# Patient Record
Sex: FEMALE | Race: BLACK OR AFRICAN AMERICAN
[De-identification: names, ages, dates, MRNs, and addresses within clinical notes are randomized per-mention and may not be internally consistent; named-entity substitution may affect disease eponyms.]

---

## 2020-04-17 ENCOUNTER — HOSPITAL ENCOUNTER (INPATIENT)
Dept: HOSPITAL 88 - ER | Age: 60
LOS: 5 days | Discharge: NURSING, REHAB OR LONG-TERM CARE | DRG: 377 | End: 2020-04-22
Attending: INTERNAL MEDICINE | Admitting: INTERNAL MEDICINE
Payer: COMMERCIAL

## 2020-04-17 VITALS — HEIGHT: 60 IN | BODY MASS INDEX: 29.66 KG/M2 | WEIGHT: 151.06 LBS

## 2020-04-17 DIAGNOSIS — I48.0: ICD-10-CM

## 2020-04-17 DIAGNOSIS — Z99.2: ICD-10-CM

## 2020-04-17 DIAGNOSIS — D62: ICD-10-CM

## 2020-04-17 DIAGNOSIS — Z86.73: ICD-10-CM

## 2020-04-17 DIAGNOSIS — E78.5: ICD-10-CM

## 2020-04-17 DIAGNOSIS — N18.6: ICD-10-CM

## 2020-04-17 DIAGNOSIS — M62.82: ICD-10-CM

## 2020-04-17 DIAGNOSIS — Z79.01: ICD-10-CM

## 2020-04-17 DIAGNOSIS — K83.1: ICD-10-CM

## 2020-04-17 DIAGNOSIS — E87.6: ICD-10-CM

## 2020-04-17 DIAGNOSIS — I13.11: ICD-10-CM

## 2020-04-17 DIAGNOSIS — G20: ICD-10-CM

## 2020-04-17 DIAGNOSIS — K55.21: Primary | ICD-10-CM

## 2020-04-17 DIAGNOSIS — I24.9: ICD-10-CM

## 2020-04-17 DIAGNOSIS — E87.5: ICD-10-CM

## 2020-04-17 DIAGNOSIS — K57.30: ICD-10-CM

## 2020-04-17 LAB
ALBUMIN SERPL-MCNC: 1.8 G/DL (ref 3.5–5)
ALBUMIN/GLOB SERPL: 0.4 {RATIO} (ref 0.8–2)
ALP SERPL-CCNC: 265 IU/L (ref 40–150)
ALT SERPL-CCNC: 471 IU/L (ref 0–55)
ANION GAP SERPL CALC-SCNC: 12.1 MMOL/L (ref 8–16)
BASOPHILS # BLD AUTO: 0.1 10*3/UL (ref 0–0.1)
BASOPHILS NFR BLD AUTO: 1 % (ref 0–1)
BUN SERPL-MCNC: 19 MG/DL (ref 7–26)
BUN/CREAT SERPL: 6 (ref 6–25)
CALCIUM SERPL-MCNC: 8.9 MG/DL (ref 8.4–10.2)
CHLORIDE SERPL-SCNC: 106 MMOL/L (ref 98–107)
CK MB SERPL-MCNC: 11.3 NG/ML (ref 0–5)
CK MB SERPL-MCNC: 9.9 NG/ML (ref 0–5)
CK SERPL-CCNC: 777 IU/L (ref 29–168)
CK SERPL-CCNC: 907 IU/L (ref 29–168)
CO2 SERPL-SCNC: 26 MMOL/L (ref 22–29)
DEPRECATED APTT PLAS QN: 59.9 SECONDS (ref 23.8–35.5)
DEPRECATED INR PLAS: 1.86
DEPRECATED NEUTROPHILS # BLD AUTO: 6 10*3/UL (ref 2.1–6.9)
EGFRCR SERPLBLD CKD-EPI 2021: 17 ML/MIN (ref 60–?)
EOSINOPHIL # BLD AUTO: 0.2 10*3/UL (ref 0–0.4)
EOSINOPHIL NFR BLD AUTO: 1.5 % (ref 0–6)
ERYTHROCYTE [DISTWIDTH] IN CORD BLOOD: 21.1 % (ref 11.7–14.4)
GLOBULIN PLAS-MCNC: 4.6 G/DL (ref 2.3–3.5)
GLUCOSE SERPLBLD-MCNC: 103 MG/DL (ref 74–118)
HCT VFR BLD AUTO: 18.9 % (ref 34.2–44.1)
HGB BLD-MCNC: 6.6 G/DL (ref 12–16)
LYMPHOCYTES # BLD: 3.5 10*3/UL (ref 1–3.2)
LYMPHOCYTES NFR BLD AUTO: 30 % (ref 18–39.1)
MCH RBC QN AUTO: 28.1 PG (ref 28–32)
MCHC RBC AUTO-ENTMCNC: 34.9 G/DL (ref 31–35)
MCV RBC AUTO: 80.4 FL (ref 81–99)
MONOCYTES # BLD AUTO: 1.9 10*3/UL (ref 0.2–0.8)
MONOCYTES NFR BLD AUTO: 16.3 % (ref 4.4–11.3)
NEUTS SEG NFR BLD AUTO: 50.9 % (ref 38.7–80)
PLATELET # BLD AUTO: 153 X10E3/UL (ref 140–360)
POTASSIUM SERPL-SCNC: 5.1 MMOL/L (ref 3.5–5.1)
PROTHROMBIN TIME: 22.8 SECONDS (ref 11.9–14.5)
RBC # BLD AUTO: 2.35 X10E6/UL (ref 3.6–5.1)
SODIUM SERPL-SCNC: 139 MMOL/L (ref 136–145)

## 2020-04-17 PROCEDURE — 80048 BASIC METABOLIC PNL TOTAL CA: CPT

## 2020-04-17 PROCEDURE — 44391 COLONOSCOPY FOR BLEEDING: CPT

## 2020-04-17 PROCEDURE — 85610 PROTHROMBIN TIME: CPT

## 2020-04-17 PROCEDURE — 83540 ASSAY OF IRON: CPT

## 2020-04-17 PROCEDURE — 84484 ASSAY OF TROPONIN QUANT: CPT

## 2020-04-17 PROCEDURE — 74181 MRI ABDOMEN W/O CONTRAST: CPT

## 2020-04-17 PROCEDURE — 82746 ASSAY OF FOLIC ACID SERUM: CPT

## 2020-04-17 PROCEDURE — 87340 HEPATITIS B SURFACE AG IA: CPT

## 2020-04-17 PROCEDURE — 93005 ELECTROCARDIOGRAM TRACING: CPT

## 2020-04-17 PROCEDURE — 99285 EMERGENCY DEPT VISIT HI MDM: CPT

## 2020-04-17 PROCEDURE — 83880 ASSAY OF NATRIURETIC PEPTIDE: CPT

## 2020-04-17 PROCEDURE — 80053 COMPREHEN METABOLIC PANEL: CPT

## 2020-04-17 PROCEDURE — 74177 CT ABD & PELVIS W/CONTRAST: CPT

## 2020-04-17 PROCEDURE — 86920 COMPATIBILITY TEST SPIN: CPT

## 2020-04-17 PROCEDURE — 86900 BLOOD TYPING SEROLOGIC ABO: CPT

## 2020-04-17 PROCEDURE — 82728 ASSAY OF FERRITIN: CPT

## 2020-04-17 PROCEDURE — 82550 ASSAY OF CK (CPK): CPT

## 2020-04-17 PROCEDURE — 85045 AUTOMATED RETICULOCYTE COUNT: CPT

## 2020-04-17 PROCEDURE — 36415 COLL VENOUS BLD VENIPUNCTURE: CPT

## 2020-04-17 PROCEDURE — 82553 CREATINE MB FRACTION: CPT

## 2020-04-17 PROCEDURE — 84466 ASSAY OF TRANSFERRIN: CPT

## 2020-04-17 PROCEDURE — 85025 COMPLETE CBC W/AUTO DIFF WBC: CPT

## 2020-04-17 PROCEDURE — 71045 X-RAY EXAM CHEST 1 VIEW: CPT

## 2020-04-17 PROCEDURE — 82607 VITAMIN B-12: CPT

## 2020-04-17 PROCEDURE — 93306 TTE W/DOPPLER COMPLETE: CPT

## 2020-04-17 PROCEDURE — 85018 HEMOGLOBIN: CPT

## 2020-04-17 PROCEDURE — 86850 RBC ANTIBODY SCREEN: CPT

## 2020-04-17 PROCEDURE — 85730 THROMBOPLASTIN TIME PARTIAL: CPT

## 2020-04-17 PROCEDURE — 85014 HEMATOCRIT: CPT

## 2020-04-17 PROCEDURE — 96361 HYDRATE IV INFUSION ADD-ON: CPT

## 2020-04-17 PROCEDURE — 30243N1 TRANSFUSION OF NONAUTOLOGOUS RED BLOOD CELLS INTO CENTRAL VEIN, PERCUTANEOUS APPROACH: ICD-10-PCS | Performed by: INTERNAL MEDICINE

## 2020-04-17 NOTE — XMS REPORT
Patient Summary Document

                             Created on: 2020



JOSHUA BORDEN

External Reference #: [id number]

: 1960

Sex: Female



Demographics







                          Address                   [address] [place] [address]  [address]

 

                          Home Phone                [phone]

 

                          Preferred Language        Unknown

 

                          Marital Status            Unknown

 

                          Church Affiliation     Unknown

 

                          Race                      Unknown

 

                          Ethnic Group              Unknown





Author







                          Author                    [facility]

 

                          [facility]

 

                          Address                   Unknown

 

                          Phone                     Unavailable







Support







                Name            Relationship    Address         Phone

 

                    JANEY RUSH    PRS                 [address] [place]  [address] [phone]

 

                    JANEY RUSH    PRS                 [address] [place]  [address] [phone]

 

                    JANEY RUSH    PRS                 UNKNOWN

[place]  [address] [phone]







Care Team Providers







                    Care Team Member Name    Role                Phone

 

                          Unavailable               Unavailable







Payers







             Payer Name    Policy Type    Policy Number    Effective Date    Expiration Date







Problems

This patient has no known problems.



Allergies, Adverse Reactions, Alerts







          Allergy Name    Allergy Type    Status    Severity    Reaction(s)    Onset Date    Inactive 

Date                      Treating Clinician        Comments

 

        No Known Allergies    DA      Active    U               2017 00:00:00                     







Medications

This patient has no known medications.



Results







           Test Description    Test Time    Test Comments    Text Results    Atomic Results    Result

 Comments

 

                AB HEPATITIS A    2020 07:09:00                      

 

   

 

                AB HEPATITIS A (test code=HAVAB)    Negative        Negative         





AB HEPATITIS A [id number] 07:09:00* 





                Test Item       Value           Reference Range    Comments

 

                AB HEPATITIS A IGM (test code=HAVMAB)                                     





AB HEPATITIS -77-25 07:09:00* 





                Test Item       Value           Reference Range    Comments

 

                AB HEPATITIS A (test code=HAVAB)    Negative        Negative         





AB HEPATITIS A [id number] 07:09:00* 





                Test Item       Value           Reference Range    Comments

 

                AB HEPATITIS A IGM (test code=HAVMAB)    Negative        Negative        Performed At:  [facility]

 [address] [id number] Jaren CHANG MD Ph:[phone]





COMPREHENSIVE METABOLIC [id number] 06:11:00* 





                Test Item       Value           Reference Range    Comments

 

                SODIUM (test code=NA)    136 mmol/L      134-147          

 

                POTASSIUM (test code=K)    3.2 mmol/L      3.4-5.0          

 

                CHLORIDE (test code=CL)    103 mmol/L      100-108          

 

                CARBON DIOXIDE (test code=CO2)    25 mmol/L       21-32            

 

                ANION GAP (test code=GAP)    8.0 GAP calc    4.0-15.0         

 

                GLUCOSE (test code=GLU)    114 MG/DL                  

 

                BLOOD UREA NITROGEN (test code=BUN)    30 MG/DL        7-18             

 

                GLOMERULAR FILTRATION RATE (test code=GFR)    24 estGFR       >60              

 

                CREATININE (test code=CREAT)    2.6 MG/DL       0.6-1.0          

 

                TOTAL PROTEIN (test code=PROT)    6.3 G/DL        6.4-8.2          

 

                ALBUMIN (test code=ALB)    1.8 G/DL        3.4-5.0          

 

                GLOBULIN (test code=GLOB)    4.5 GM/dL                        

 

                ALBUMIN/GLOBULIN RATIO (test code=A/G)    0.4 RATIO       1.2-2.2          

 

                CALCIUM (test code=CA)    8.4 MG/DL       8.5-10.1         

 

                BILIRUBIN TOTAL (test code=BILT)    2.50 MG/DL      0.2-1.2          

 

                SGOT/AST (test code=AST)    1488 Unit/L     15-37            

 

                SGPT/ALT (test code=ALT)    1697 Unit/L     12-78            

 

                ALKALINE PHOSPHATASE TOTAL (test code=ALKP)    259 Unit/L                 





CBC W/AUTO [id number] 05:52:00* 





                Test Item       Value           Reference Range    Comments

 

                WHITE BLOOD CELL (test code=WBC)    7.9 K/mm3       3.5-11.0         

 

                RED BLOOD CELL (test code=RBC)    3.13 M/mm3      4.70-6.10        

 

                HEMOGLOBIN (test code=HGB)    8.9 G/DL        10.4-14.9        

 

                HEMATOCRIT (test code=HCT)    24.5 %          31.5-44.1        

 

                MEAN CELL VOLUME (test code=MCV)    78.3 Fl         84.5-98.6        

 

                MEAN CELL HGB (test code=MCH)    28.4 pg         27.0-34.2        

 

                MEAN CELL HGB CONCETRATION (test code=MCHC)    36.3 G/DL       31.5-34.0        

 

                RED CELL DISTRIBUTION WIDTH (test code=RDW)    20.6 SD         11.5-14.5        

 

                PLATELET COUNT (test code=PLT)    241.0 K/mm3     150-450          

 

                MEAN PLATELET VOLUME (test code=MPV)    10.90 fL        7.0-10.5         

 

                NEUTROPHIL % (test code=NT%)    58.5 %          40-76            

 

                LYMPHOCYTE % (test code=LY%)    20.0 %          20.5-51.1        

 

                MONOCYTE % (test code=MO%)    17.0 %          1.7-9.3          

 

                EOSINOPHIL % (test code=EO%)    3.5 %           0.0-6.0          

 

                BASOPHIL % (test code=BA%)    1.0 %           0.0-2.0          

 

                NEUTROPHIL # (test code=NT#)    4.62 K/mm3      1.8-7.6          

 

                LYMPHOCYTE # (test code=LY#)    1.6 K/mm3       0.6-3.2          

 

                MONOCYTE # (test code=MO#)    1.3 K/mm3       0.3-1.1          

 

                EOSINOPHIL # (test code=EO#)    0.3 K/mm3       0.0-0.4          

 

                BASOPHIL # (test code=BA#)    0.1 K/mm3       0.0-0.1          

 

                MANUAL DIFF REQUIRED (test code=MDIFF)    NO DIFF/SCN     CRITERIA         





- SP FLUORO GUID CTRL ACC [id number] 17:23:00 Name: JOSHUA BORDEN            
         Formerly [facility]                      : 1960 Age/S: 59  / F      
  [id number]              Unit #: [id number]     Loc:               
[place] [address]                Phys: Danial Arboleda MD                     
                           Acct: [id number]  Dis Date:               Status: 
ADM IN                                  PHONE #: [phone]     Exam Date: 
2020  6547                     FAX #:                  Reason: TUNNELED 
DIALYSIS CATH PLACEMENT                   EXAMS:                                
              CPT:           [id number] SP FLUORO GUID CTRL ACC DEV              
 [id number]             Fluoro Time: :24        DAP (Gy m2): 45       Air Kerma 
(mGy): 2            EXAMINATION: TUNNELED CENTRAL VENOUS CATHETER PLACEMENT 
USING       ULTRASOUND AND FLUOROSCOPIC GUIDANCE.               LOCATION: S17.  
            HISTORY: JENN on CKD, hyperkalemia, request is made of tunneled      
dialysis catheter.               COMPARISON: None.               SEDATION: 
Moderate sedation was administered under the attending       physician's 
direction and continuous monitoring by a trained nurse       specialist who was 
independent from those actually performing the       procedure.  Total monitored
intraservice sedation time was 25 minutes.               RADIATION DOSE: 2 mGy. 
             TECHNIQUE: The risks, benefits and alternatives were discussed and 
     informed consent was obtained. Prior to beginning the procedure,       
Universal Protocol was used to confirm the patient's identity and       planned 
procedure. Prior to the procedure, the central veins were       evaluated by 
ultrasound, an image recorded and saved in PACS.                Maximum sterile 
barriers including cap, mask, hand hygiene, sterile       gloves, sterile gown, 
large sterile drape and cutaneous antisepsis       were used.                The
skin over the right internal jugular vein was sterilely prepped,       draped 
and infiltrated with 1% lidocaine. The vein was accessed with a       21 gauge 
needle using realtime ultrasound guidance. A guidewire and       catheter were 
then passed centrally using fluoroscopic guidance. The       intravascular valerio
th from the access site to the right atrium was then       assessed.            
   After infiltrating the skin in the subclavicular region with 1%       lidoca
ine, a short transverse incision was made and the 24 cm DuraMax       was tunnel
ed to the internal jugular access site and inserted through       a peel-away sh
eath. The catheter was flushed with 100U/ml heparin.                The incision
in the lower neck was closed using 3-0 Vicryl and       Dermabond. A sterile dr
essing was applied.                ESTIMATED BLOOD LOSS: Less than 30 milliliter
s.               DISCHARGED TO: Recovery and then to inpatient unit.            
PAGE  1                       Signed Report                    (CONTINUED)  Dave herron: JOSHUA BORDEN                      Formerly [facility]                      :  Age/S: 59  / F         [id number] [facility]              Unit #: [id number]
06     Loc:               [place] [address]                Phys: Amelia Arboleda MD                                                 Acct: [id number]  Dis Da
te:               Status: ADM IN                                  PHONE #: [phone]
[phone]     Exam Date: 2020  1412                     FAX #:              
   Reason: TUNNELED DIALYSIS CATH PLACEMENT                   EXAMS:            
                                  CPT:           [id number] SP FLUORO GUID CTRL 
ACC DEV                [id number]             Fluoro Time: :24        DAP (Gy m2): 45
      Air Kerma (mGy): 2       <Continued>        CONDITION: Stable.            
  FINDINGS: Ultrasound image shows a patent vein in the lower neck. The       
final fluoroscopic image demonstrates the catheter with its tip in the       
right atrium. No complications are seen.                  IMPRESSION: Successful
tunneled catheter placement.                   PLAN: The catheter is ready for 
immediate use. When treatment is         completed, removal can be scheduled by 
calling VIR.                 ** Electronically Signed by CAITLIN Ovalle **         **               on 2020 at 1723               **         
            Reported and signed by: Arun Ovalle M.D.                   
            CC: Danial Arboleda MD                                              
                                                                        PAGE  2 
                     Signed Report                                 Name: 
JOSHUA BORDENland                      : 
1960 Age/S: 59  / F         [id number]              Unit #: 
[id number]     Loc:               [place] [address]                Phys: 
Danial Arboleda MD                                                 Acct: 
[id number]  Dis Date:               Status: ADM IN                            
     PHONE #: [phone]     Exam Date: 2020  1415                     
FAX #:                  Reason: TUNNELED DIALYSIS CATH PLACEMENT                
  EXAMS:                                               CPT:           [id number] 
SP FLUORO GUID CTRL ACC DEV                [id number]             Fluoro Time: :24   
    DAP (Gy m2): 45       Air Kerma (mGy): 2       <Continued> Technologist: 
Oscar Reyes, RT(R)                                     Trnscb Date/Time: 
2020 () [id number].[id number]                       Orig Print D/T: S: 2020
()                                             PAGE  3                      
Signed Report                               CBC W/AUTO [id number] 05:41:00* 





                Test Item       Value           Reference Range    Comments

 

                WHITE BLOOD CELL (test code=WBC)    11.0 K/mm3      3.5-11.0         

 

                RED BLOOD CELL (test code=RBC)    3.32 M/mm3      4.70-6.10        

 

                HEMOGLOBIN (test code=HGB)    9.3 G/DL        10.4-14.9        

 

                HEMATOCRIT (test code=HCT)    26.0 %          31.5-44.1        

 

                MEAN CELL VOLUME (test code=MCV)    78.3 Fl         84.5-98.6        

 

                MEAN CELL HGB (test code=MCH)    28.0 pg         27.0-34.2        

 

                MEAN CELL HGB CONCETRATION (test code=MCHC)    35.8 G/DL       31.5-34.0        

 

                RED CELL DISTRIBUTION WIDTH (test code=RDW)    20.4 SD         11.5-14.5        

 

                PLATELET COUNT (test code=PLT)    261.0 K/mm3     150-450          

 

                MEAN PLATELET VOLUME (test code=MPV)    12.00 fL        7.0-10.5         

 

                NEUTROPHIL % (test code=NT%)    62.4 %          40-76            

 

                LYMPHOCYTE % (test code=LY%)    19.3 %          20.5-51.1        

 

                MONOCYTE % (test code=MO%)    14.7 %          1.7-9.3          

 

                EOSINOPHIL % (test code=EO%)    3.1 %           0.0-6.0          

 

                BASOPHIL % (test code=BA%)    0.5 %           0.0-2.0          

 

                NEUTROPHIL # (test code=NT#)    6.84 K/mm3      1.8-7.6          

 

                LYMPHOCYTE # (test code=LY#)    2.1 K/mm3       0.6-3.2          

 

                MONOCYTE # (test code=MO#)    1.6 K/mm3       0.3-1.1          

 

                EOSINOPHIL # (test code=EO#)    0.3 K/mm3       0.0-0.4          

 

                BASOPHIL # (test code=BA#)    0.1 K/mm3       0.0-0.1          

 

                MANUAL DIFF REQUIRED (test code=MDIFF)    NO DIFF/SCN     CRITERIA        SLIDE REVIEW CONSISTANT

 WITH AUTO DIFFERENTIAL.





CBC W/AUTO [id number] 05:41:00* 





                Test Item       Value           Reference Range    Comments

 

                WHITE BLOOD CELL (test code=WBC)    11.0 K/mm3      3.5-11.0         

 

                RED BLOOD CELL (test code=RBC)    3.32 M/mm3      4.70-6.10        

 

                HEMOGLOBIN (test code=HGB)    9.3 G/DL        10.4-14.9        

 

                HEMATOCRIT (test code=HCT)    26.0 %          31.5-44.1        

 

                MEAN CELL VOLUME (test code=MCV)    78.3 Fl         84.5-98.6        

 

                MEAN CELL HGB (test code=MCH)    28.0 pg         27.0-34.2        

 

                MEAN CELL HGB CONCETRATION (test code=MCHC)    35.8 G/DL       31.5-34.0        

 

                RED CELL DISTRIBUTION WIDTH (test code=RDW)    20.4 SD         11.5-14.5        

 

                PLATELET COUNT (test code=PLT)    261.0 K/mm3     150-450          

 

                MEAN PLATELET VOLUME (test code=MPV)    12.00 fL        7.0-10.5         

 

                NEUTROPHIL % (test code=NT%)    62.4 %          40-76            

 

                LYMPHOCYTE % (test code=LY%)    19.3 %          20.5-51.1        

 

                MONOCYTE % (test code=MO%)    14.7 %          1.7-9.3          

 

                EOSINOPHIL % (test code=EO%)    3.1 %           0.0-6.0          

 

                BASOPHIL % (test code=BA%)    0.5 %           0.0-2.0          

 

                NEUTROPHIL # (test code=NT#)    6.84 K/mm3      1.8-7.6          

 

                LYMPHOCYTE # (test code=LY#)    2.1 K/mm3       0.6-3.2          

 

                MONOCYTE # (test code=MO#)    1.6 K/mm3       0.3-1.1          

 

                EOSINOPHIL # (test code=EO#)    0.3 K/mm3       0.0-0.4          

 

                BASOPHIL # (test code=BA#)    0.1 K/mm3       0.0-0.1          

 

                MANUAL DIFF REQUIRED (test code=MDIFF)    NO DIFF/SCN     CRITERIA        SLIDE REVIEW CONSISTANT

 WITH AUTO DIFFERENTIAL.





RBC [id number] 05:41:00* 





                Test Item       Value           Reference Range    Comments

 

                ANISOCYTOSIS (test code=ANISO)    TRACE           NONE             

 

                TARGET CELLS (test code=TGT)    1+ ON SCAN      NONE             

 

                PLATELET ESTIMATE (test code=PLTEST)    ADEQUATE THOUSAND    ADEQUATE         

 

                PLATELET MORPHOLOGY (test code=PLTMORPH)    NORMAL                           





CBC W/AUTO [id number] 05:41:00* 





                Test Item       Value           Reference Range    Comments

 

                WHITE BLOOD CELL (test code=WBC)    11.0 K/mm3      3.5-11.0         

 

                RED BLOOD CELL (test code=RBC)    3.32 M/mm3      4.70-6.10        

 

                HEMOGLOBIN (test code=HGB)    9.3 G/DL        10.4-14.9        

 

                HEMATOCRIT (test code=HCT)    26.0 %          31.5-44.1        

 

                MEAN CELL VOLUME (test code=MCV)    78.3 Fl         84.5-98.6        

 

                MEAN CELL HGB (test code=MCH)    28.0 pg         27.0-34.2        

 

                MEAN CELL HGB CONCETRATION (test code=MCHC)    35.8 G/DL       31.5-34.0        

 

                RED CELL DISTRIBUTION WIDTH (test code=RDW)    20.4 SD         11.5-14.5        

 

                PLATELET COUNT (test code=PLT)    261.0 K/mm3     150-450          

 

                MEAN PLATELET VOLUME (test code=MPV)    12.00 fL        7.0-10.5         

 

                NEUTROPHIL % (test code=NT%)    62.4 %          40-76            

 

                LYMPHOCYTE % (test code=LY%)    19.3 %          20.5-51.1        

 

                MONOCYTE % (test code=MO%)    14.7 %          1.7-9.3          

 

                EOSINOPHIL % (test code=EO%)    3.1 %           0.0-6.0          

 

                BASOPHIL % (test code=BA%)    0.5 %           0.0-2.0          

 

                NEUTROPHIL # (test code=NT#)    6.84 K/mm3      1.8-7.6          

 

                LYMPHOCYTE # (test code=LY#)    2.1 K/mm3       0.6-3.2          

 

                MONOCYTE # (test code=MO#)    1.6 K/mm3       0.3-1.1          

 

                EOSINOPHIL # (test code=EO#)    0.3 K/mm3       0.0-0.4          

 

                BASOPHIL # (test code=BA#)    0.1 K/mm3       0.0-0.1          

 

                MANUAL DIFF REQUIRED (test code=MDIFF)    NO DIFF/SCN     CRITERIA        SLIDE REVIEW CONSISTANT

 WITH AUTO DIFFERENTIAL.





BASIC METABOLIC [id number] 05:33:00* 





                Test Item       Value           Reference Range    Comments

 

                SODIUM (test code=NA)    135 mmol/L      134-147          

 

                POTASSIUM (test code=K)    4.5 mmol/L      3.4-5.0          

 

                CHLORIDE (test code=CL)    104 mmol/L      100-108          

 

                CARBON DIOXIDE (test code=CO2)    22 mmol/L       21-32            

 

                ANION GAP (test code=GAP)    9.0 GAP calc    4.0-15.0         

 

                GLUCOSE (test code=GLU)    125 MG/DL                  

 

                BLOOD UREA NITROGEN (test code=BUN)    73 MG/DL        7-18             

 

                GLOMERULAR FILTRATION RATE (test code=GFR)    12 estGFR       >60              

 

                CREATININE (test code=CREAT)    4.7 MG/DL       0.6-1.0          

 

                CALCIUM (test code=CA)    8.9 MG/DL       8.5-10.1         





COMPREHENSIVE METABOLIC [id number] 05:33:00* 





                Test Item       Value           Reference Range    Comments

 

                TOTAL PROTEIN (test code=PROT)    6.5 G/DL        6.4-8.2          

 

                ALBUMIN (test code=ALB)    1.8 G/DL        3.4-5.0          

 

                GLOBULIN (test code=GLOB)    4.7 GM/dL                        

 

                ALBUMIN/GLOBULIN RATIO (test code=A/G)    0.4 RATIO       1.2-2.2          

 

                BILIRUBIN TOTAL (test code=BILT)    2.20 MG/DL      0.2-1.2          

 

                SGOT/AST (test code=AST)    1219 Unit/L     15-37            

 

                SGPT/ALT (test code=ALT)    1464 Unit/L     12-78            

 

                ALKALINE PHOSPHATASE TOTAL (test code=ALKP)    271 Unit/L                 





PROTHROMBIN [id number] 05:13:00* 





                Test Item       Value           Reference Range    Comments

 

                PT PATIENT (test code=PTP)    21.1 SECONDS    9.3-12.9         

 

                INTERNATIONAL NORMAL RATIO (test code=INR)    1.84 INR Unit    0.8-1.2          





THROMBOPLASTIN TIME [id number] 05:13:00* 





                Test Item       Value           Reference Range    Comments

 

                THROMBOPLASTIN TIME PARTIAL (test code=PTT)    31.9 SECONDS    26-35            





CBC W/AUTO [id number] 05:07:00* 





                Test Item       Value           Reference Range    Comments

 

                WHITE BLOOD CELL (test code=WBC)    11.0 K/mm3      3.5-11.0         

 

                RED BLOOD CELL (test code=RBC)    3.32 M/mm3      4.70-6.10        

 

                HEMOGLOBIN (test code=HGB)    9.3 G/DL        10.4-14.9        

 

                HEMATOCRIT (test code=HCT)    26.0 %          31.5-44.1        

 

                MEAN CELL VOLUME (test code=MCV)    78.3 Fl         84.5-98.6        

 

                MEAN CELL HGB (test code=MCH)    28.0 pg         27.0-34.2        

 

                MEAN CELL HGB CONCETRATION (test code=MCHC)    35.8 G/DL       31.5-34.0        

 

                RED CELL DISTRIBUTION WIDTH (test code=RDW)    20.4 SD         11.5-14.5        

 

                PLATELET COUNT (test code=PLT)    261.0 K/mm3     150-450          

 

                MEAN PLATELET VOLUME (test code=MPV)    12.00 fL        7.0-10.5         

 

                NEUTROPHIL % (test code=NT%)     %              40-76            

 

                LYMPHOCYTE % (test code=LY%)     %              20.5-51.1        

 

                MONOCYTE % (test code=MO%)     %              1.7-9.3          

 

                EOSINOPHIL % (test code=EO%)     %              0.0-6.0          

 

                BASOPHIL % (test code=BA%)     %              0.0-2.0          

 

                NEUTROPHIL # (test code=NT#)     K/mm3          1.8-7.6          

 

                LYMPHOCYTE # (test code=LY#)     K/mm3          0.6-3.2          

 

                MONOCYTE # (test code=MO#)     K/mm3          0.3-1.1          

 

                EOSINOPHIL # (test code=EO#)     K/mm3          0.0-0.4          

 

                BASOPHIL # (test code=BA#)     K/mm3          0.0-0.1          

 

                MANUAL DIFF REQUIRED (test code=MDIFF)     DIFF/SCN       CRITERIA         





PROTHROMBIN [id number] 10:06:00* 





                Test Item       Value           Reference Range    Comments

 

                PT PATIENT (test code=PTP)    24.4 SECONDS    9.3-12.9         

 

                INTERNATIONAL NORMAL RATIO (test code=INR)    2.13 INR Unit    0.8-1.2          





THROMBOPLASTIN TIME [id number] 10:06:00* 





                Test Item       Value           Reference Range    Comments

 

                THROMBOPLASTIN TIME PARTIAL (test code=PTT)    32.2 SECONDS    26-35            





CBC W/AUTO [id number] 06:03:00* 





                Test Item       Value           Reference Range    Comments

 

                WHITE BLOOD CELL (test code=WBC)    11.4 K/mm3      3.5-11.0         

 

                RED BLOOD CELL (test code=RBC)    3.47 M/mm3      4.70-6.10        

 

                HEMOGLOBIN (test code=HGB)    9.5 G/DL        10.4-14.9        

 

                HEMATOCRIT (test code=HCT)    27.1 %          31.5-44.1        

 

                MEAN CELL VOLUME (test code=MCV)    78.1 Fl         84.5-98.6        

 

                MEAN CELL HGB (test code=MCH)    27.4 pg         27.0-34.2        

 

                MEAN CELL HGB CONCETRATION (test code=MCHC)    35.1 G/DL       31.5-34.0        

 

                RED CELL DISTRIBUTION WIDTH (test code=RDW)    20.2 SD         11.5-14.5        

 

                PLATELET COUNT (test code=PLT)    248.0 K/mm3     150-450          

 

                MEAN PLATELET VOLUME (test code=MPV)    11.30 fL        7.0-10.5         

 

                NEUTROPHIL % (test code=NT%)    64.2 %          40-76            

 

                LYMPHOCYTE % (test code=LY%)    18.0 %          20.5-51.1        

 

                MONOCYTE % (test code=MO%)    14.2 %          1.7-9.3          

 

                EOSINOPHIL % (test code=EO%)    3.0 %           0.0-6.0          

 

                BASOPHIL % (test code=BA%)    0.6 %           0.0-2.0          

 

                NEUTROPHIL # (test code=NT#)    7.31 K/mm3      1.8-7.6          

 

                LYMPHOCYTE # (test code=LY#)    2.1 K/mm3       0.6-3.2          

 

                MONOCYTE # (test code=MO#)    1.6 K/mm3       0.3-1.1          

 

                EOSINOPHIL # (test code=EO#)    0.3 K/mm3       0.0-0.4          

 

                BASOPHIL # (test code=BA#)    0.1 K/mm3       0.0-0.1          

 

                MANUAL DIFF REQUIRED (test code=MDIFF)    NO DIFF/SCN     CRITERIA        SLIDE REVIEW CONSISTANT

 WITH AUTO DIFFERENTIAL.





CBC W/AUTO [id number] 06:03:00* 





                Test Item       Value           Reference Range    Comments

 

                WHITE BLOOD CELL (test code=WBC)    11.4 K/mm3      3.5-11.0         

 

                RED BLOOD CELL (test code=RBC)    3.47 M/mm3      4.70-6.10        

 

                HEMOGLOBIN (test code=HGB)    9.5 G/DL        10.4-14.9        

 

                HEMATOCRIT (test code=HCT)    27.1 %          31.5-44.1        

 

                MEAN CELL VOLUME (test code=MCV)    78.1 Fl         84.5-98.6        

 

                MEAN CELL HGB (test code=MCH)    27.4 pg         27.0-34.2        

 

                MEAN CELL HGB CONCETRATION (test code=MCHC)    35.1 G/DL       31.5-34.0        

 

                RED CELL DISTRIBUTION WIDTH (test code=RDW)    20.2 SD         11.5-14.5        

 

                PLATELET COUNT (test code=PLT)    248.0 K/mm3     150-450          

 

                MEAN PLATELET VOLUME (test code=MPV)    11.30 fL        7.0-10.5         

 

                NEUTROPHIL % (test code=NT%)    64.2 %          40-76            

 

                LYMPHOCYTE % (test code=LY%)    18.0 %          20.5-51.1        

 

                MONOCYTE % (test code=MO%)    14.2 %          1.7-9.3          

 

                EOSINOPHIL % (test code=EO%)    3.0 %           0.0-6.0          

 

                BASOPHIL % (test code=BA%)    0.6 %           0.0-2.0          

 

                NEUTROPHIL # (test code=NT#)    7.31 K/mm3      1.8-7.6          

 

                LYMPHOCYTE # (test code=LY#)    2.1 K/mm3       0.6-3.2          

 

                MONOCYTE # (test code=MO#)    1.6 K/mm3       0.3-1.1          

 

                EOSINOPHIL # (test code=EO#)    0.3 K/mm3       0.0-0.4          

 

                BASOPHIL # (test code=BA#)    0.1 K/mm3       0.0-0.1          

 

                MANUAL DIFF REQUIRED (test code=MDIFF)    NO DIFF/SCN     CRITERIA        SLIDE REVIEW CONSISTANT

 WITH AUTO DIFFERENTIAL.





RBC [id number] 06:03:00* 





                Test Item       Value           Reference Range    Comments

 

                ANISOCYTOSIS (test code=ANISO)    1+              NONE             

 

                MICROCYTOSIS (test code=MICR)    1+ ON SCAN      NONE             

 

                HYPERSEGMENTED POLYS (test code=HYPP)    TRACE ON SCAN    NONE             

 

                PLATELET ESTIMATE (test code=PLTEST)    ADEQUATE THOUSAND    ADEQUATE         

 

                PLATELET MORPHOLOGY (test code=PLTMORPH)    NORMAL                           





CBC W/AUTO [id number] 06:03:00* 





                Test Item       Value           Reference Range    Comments

 

                WHITE BLOOD CELL (test code=WBC)    11.4 K/mm3      3.5-11.0         

 

                RED BLOOD CELL (test code=RBC)    3.47 M/mm3      4.70-6.10        

 

                HEMOGLOBIN (test code=HGB)    9.5 G/DL        10.4-14.9        

 

                HEMATOCRIT (test code=HCT)    27.1 %          31.5-44.1        

 

                MEAN CELL VOLUME (test code=MCV)    78.1 Fl         84.5-98.6        

 

                MEAN CELL HGB (test code=MCH)    27.4 pg         27.0-34.2        

 

                MEAN CELL HGB CONCETRATION (test code=MCHC)    35.1 G/DL       31.5-34.0        

 

                RED CELL DISTRIBUTION WIDTH (test code=RDW)    20.2 SD         11.5-14.5        

 

                PLATELET COUNT (test code=PLT)    248.0 K/mm3     150-450          

 

                MEAN PLATELET VOLUME (test code=MPV)    11.30 fL        7.0-10.5         

 

                NEUTROPHIL % (test code=NT%)    64.2 %          40-76            

 

                LYMPHOCYTE % (test code=LY%)    18.0 %          20.5-51.1        

 

                MONOCYTE % (test code=MO%)    14.2 %          1.7-9.3          

 

                EOSINOPHIL % (test code=EO%)    3.0 %           0.0-6.0          

 

                BASOPHIL % (test code=BA%)    0.6 %           0.0-2.0          

 

                NEUTROPHIL # (test code=NT#)    7.31 K/mm3      1.8-7.6          

 

                LYMPHOCYTE # (test code=LY#)    2.1 K/mm3       0.6-3.2          

 

                MONOCYTE # (test code=MO#)    1.6 K/mm3       0.3-1.1          

 

                EOSINOPHIL # (test code=EO#)    0.3 K/mm3       0.0-0.4          

 

                BASOPHIL # (test code=BA#)    0.1 K/mm3       0.0-0.1          

 

                MANUAL DIFF REQUIRED (test code=MDIFF)    NO DIFF/SCN     CRITERIA        SLIDE REVIEW CONSISTANT

 WITH AUTO DIFFERENTIAL.





BASIC METABOLIC [id number] 05:10:00* 





                Test Item       Value           Reference Range    Comments

 

                SODIUM (test code=NA)    134 mmol/L      134-147          

 

                POTASSIUM (test code=K)    4.4 mmol/L      3.4-5.0          

 

                CHLORIDE (test code=CL)    103 mmol/L      100-108          

 

                CARBON DIOXIDE (test code=CO2)    22 mmol/L       21-32            

 

                ANION GAP (test code=GAP)    9.0 GAP calc    4.0-15.0         

 

                GLUCOSE (test code=GLU)    144 MG/DL                  

 

                BLOOD UREA NITROGEN (test code=BUN)    62 MG/DL        7-18             

 

                GLOMERULAR FILTRATION RATE (test code=GFR)    12 estGFR       >60              

 

                CREATININE (test code=CREAT)    4.7 MG/DL       0.6-1.0          

 

                CALCIUM (test code=CA)    8.7 MG/DL       8.5-10.1         





CBC W/AUTO [id number] 05:06:00* 





                Test Item       Value           Reference Range    Comments

 

                WHITE BLOOD CELL (test code=WBC)    11.4 K/mm3      3.5-11.0         

 

                RED BLOOD CELL (test code=RBC)    3.47 M/mm3      4.70-6.10        

 

                HEMOGLOBIN (test code=HGB)    9.5 G/DL        10.4-14.9        

 

                HEMATOCRIT (test code=HCT)    27.1 %          31.5-44.1        

 

                MEAN CELL VOLUME (test code=MCV)    78.1 Fl         84.5-98.6        

 

                MEAN CELL HGB (test code=MCH)    27.4 pg         27.0-34.2        

 

                MEAN CELL HGB CONCETRATION (test code=MCHC)    35.1 G/DL       31.5-34.0        

 

                RED CELL DISTRIBUTION WIDTH (test code=RDW)    20.2 SD         11.5-14.5        

 

                PLATELET COUNT (test code=PLT)    248.0 K/mm3     150-450          

 

                MEAN PLATELET VOLUME (test code=MPV)    11.30 fL        7.0-10.5         

 

                NEUTROPHIL % (test code=NT%)     %              40-76            

 

                LYMPHOCYTE % (test code=LY%)     %              20.5-51.1        

 

                MONOCYTE % (test code=MO%)     %              1.7-9.3          

 

                EOSINOPHIL % (test code=EO%)     %              0.0-6.0          

 

                BASOPHIL % (test code=BA%)     %              0.0-2.0          

 

                NEUTROPHIL # (test code=NT#)     K/mm3          1.8-7.6          

 

                LYMPHOCYTE # (test code=LY#)     K/mm3          0.6-3.2          

 

                MONOCYTE # (test code=MO#)     K/mm3          0.3-1.1          

 

                EOSINOPHIL # (test code=EO#)     K/mm3          0.0-0.4          

 

                BASOPHIL # (test code=BA#)     K/mm3          0.0-0.1          

 

                MANUAL DIFF REQUIRED (test code=MDIFF)     DIFF/SCN       CRITERIA         





- US ABDOMEN [id number] 09:24:00  Name: JOSHUA BORDEN                   
 Formerly [facility]                      : 1960 Age/S: 59  / F         [id number]
[address]              Unit #: [id number]     Loc:               [place]
[address]                Phys: Ney Call MD                                 
              Acct: [id number]  Dis Date:               Status: ADM IN        
                         PHONE #: [phone]     Exam Date: 2020  0830  
                  FAX #:                   Reason: ELEVATED LFTS, ABDOMINAL PAIN
                      EXAMS:                                               CPT: 
         [id number] US ABDOMEN COMPLETE                        [id number]             
      EXAMINATION:  - US ABDOMEN COMPLETE.               LOCATION: S17.         
     HISTORY: Elevated LFTs, abdominal pain, hyperkalemia, CKD.               
COMPARISON: US renal 3/25/2020.               TECHNIQUE: Examination of the 
liver, spleen, kidneys, pancreas,       gallbladder, bile ducts, aorta and 
inferior vena cava was performed.               FINDINGS:               The 
visualized liver parenchyma demonstrates diffusely increased       echogenicity.
The main portal vein is patent. Small amount of upper       abdominal free 
fluid.               There is no intra or extrahepatic biliary ductal 
dilatation. The       common duct measures 4 mm. The gallbladder is surgically 
absent.               The visualized pancreatic head and body appears 
unremarkable,       evaluation of the tail is limited by overlying bowel gas.  
Spleen is       unremarkable in size.               The right and left kidney 
measure 10.1 cm and 8.1 cm respectively.       There is no hydronephrosis. 2.1 
cm cyst in lower pole of right kidney.       1 cm cyst in lower pole of left 
kidney.               The visualized portions of abdominal aorta and IVC appear 
     unremarkable.                 IMPRESSION:                   Cholecystec
willis.                   Diffusely increased echogenicity of liver parenchyma, no
nspecific,         most commonly described in the setting of hepatic steatosis v
ersus         underlying parenchymal process.                   Small upper abdo
renata free fluid.          PAGE  1                       Signed Report          
         (CONTINUED)   Name: JOSHUA BORDEN [place]    
                 : 1960 Age/S: 59  / F         [id number] [address]      
       Unit #: [id number]     Loc:               [place] [address]             
  Phys: Ney Call MD                                                
Acct: [id number]  Dis Date:               Status: ADM IN                      
           PHONE #: [phone]     Exam Date: 2020  08                
    FAX #:                   Reason: ELEVATED LFTS, ABDOMINAL PAIN              
        EXAMS:                                               CPT:           0
[id number] US ABDOMEN COMPLETE                        [id number]               <
Continued>             ** Electronically Signed by CAITLIN Ovalle **  
      **               on 2020 at 0924               **                   
  Reported and signed by: Arun Ovalle M.D.                             
          CC: Ney Call MD; Karla HOPKINS; Danial Arboleda MD               
                                                                     
Technologist: Katarina Moore                                        [facility] 
Date/Time: 2020 (924) t.SDR.ANS4                        PAGE  2          
            Signed Report                                  Name: JOSHUA BORDEN [place]                      : 1960 Age/S: 59  
/ F         [id number] [address]              Unit #: [id number]     Loc:         
     [place] [address]                Phys: Ney Call MD               
                                Acct: [id number]  Dis Date:               
Status: ADM IN                                  PHONE #: [phone]     Exam 
Date: 2020  0830                     FAX #:                   Reason: 
ELEVATED LFTS, ABDOMINAL PAIN                       EXAMS:                      
                        CPT:           [id number] US ABDOMEN COMPLETE            
           [id number]               <Continued> Orig Print D/T: S: 2020 (927) 
   Probe:                                                                PAGE  3
                      Signed Report                               COMPREHENSIVE 
METABOLIC [id number] 06:15:00* 





                Test Item       Value           Reference Range    Comments

 

                SODIUM (test code=NA)    134 mmol/L      134-147          

 

                POTASSIUM (test code=K)    4.7 mmol/L      3.4-5.0          

 

                CHLORIDE (test code=CL)    102 mmol/L      100-108          

 

                CARBON DIOXIDE (test code=CO2)    25 mmol/L       21-32            

 

                ANION GAP (test code=GAP)    7.0 GAP calc    4.0-15.0         

 

                GLUCOSE (test code=GLU)    283 MG/DL                  

 

                BLOOD UREA NITROGEN (test code=BUN)    52 MG/DL        7-18             

 

                GLOMERULAR FILTRATION RATE (test code=GFR)    14 estGFR       >60              

 

                CREATININE (test code=CREAT)    4.2 MG/DL       0.6-1.0          

 

                TOTAL PROTEIN (test code=PROT)    5.9 G/DL        6.4-8.2          

 

                ALBUMIN (test code=ALB)    1.7 G/DL        3.4-5.0          

 

                GLOBULIN (test code=GLOB)    4.2 GM/dL                        

 

                ALBUMIN/GLOBULIN RATIO (test code=A/G)    0.4 RATIO       1.2-2.2          

 

                CALCIUM (test code=CA)    8.5 MG/DL       8.5-10.1         

 

                BILIRUBIN TOTAL (test code=BILT)    1.90 MG/DL      0.2-1.2          

 

                SGOT/AST (test code=AST)    996 Unit/L      15-37            

 

                SGPT/ALT (test code=ALT)    1142 Unit/L     12-78            

 

                ALKALINE PHOSPHATASE TOTAL (test code=ALKP)    267 Unit/L                 





[id number] 06:15:00* 





                Test Item       Value           Reference Range    Comments

 

                LIPASE (test code=LIP)    4061 Unit/L     114-286          





CBC W/AUTO [id number] 06:08:00* 





                Test Item       Value           Reference Range    Comments

 

                WHITE BLOOD CELL (test code=WBC)    12.8 K/mm3      3.5-11.0         

 

                RED BLOOD CELL (test code=RBC)    3.27 M/mm3      4.70-6.10        

 

                HEMOGLOBIN (test code=HGB)    9.1 G/DL        10.4-14.9        

 

                HEMATOCRIT (test code=HCT)    25.7 %          31.5-44.1        

 

                MEAN CELL VOLUME (test code=MCV)    78.6 Fl         84.5-98.6        

 

                MEAN CELL HGB (test code=MCH)    27.8 pg         27.0-34.2        

 

                MEAN CELL HGB CONCETRATION (test code=MCHC)    35.4 G/DL       31.5-34.0        

 

                RED CELL DISTRIBUTION WIDTH (test code=RDW)    20.0 SD         11.5-14.5        

 

                PLATELET COUNT (test code=PLT)    234.0 K/mm3     150-450          

 

                MEAN PLATELET VOLUME (test code=MPV)    11.60 fL        7.0-10.5         

 

                NEUTROPHIL % (test code=NT%)    70.7 %          40-76            

 

                LYMPHOCYTE % (test code=LY%)    15.2 %          20.5-51.1        

 

                MONOCYTE % (test code=MO%)    12.1 %          1.7-9.3          

 

                EOSINOPHIL % (test code=EO%)    1.5 %           0.0-6.0          

 

                BASOPHIL % (test code=BA%)    0.5 %           0.0-2.0          

 

                NEUTROPHIL # (test code=NT#)    9.05 K/mm3      1.8-7.6          

 

                LYMPHOCYTE # (test code=LY#)    2.0 K/mm3       0.6-3.2          

 

                MONOCYTE # (test code=MO#)    1.6 K/mm3       0.3-1.1          

 

                EOSINOPHIL # (test code=EO#)    0.2 K/mm3       0.0-0.4          

 

                BASOPHIL # (test code=BA#)    0.1 K/mm3       0.0-0.1          

 

                MANUAL DIFF REQUIRED (test code=MDIFF)    NO DIFF/SCN     CRITERIA        SLIDE REVIEW CONSISTANT

 WITH AUTO DIFFERENTIAL.





GLUCOSE BEDSIDE [id number] 05:38:00* 





                Test Item       Value           Reference Range    Comments

 

                GLUCOSE BEDSIDE TESTING (test code=GLUBED)    254 mg/dL                  





GLUCOSE BEDSIDE [id number] 05:38:00* 





                Test Item       Value           Reference Range    Comments

 

                GLUCOSE BEDSIDE TESTING (test code=GLUBED)    124 mg/dL                  





CBC W/AUTO [id number] 04:16:00* 





                Test Item       Value           Reference Range    Comments

 

                WHITE BLOOD CELL (test code=WBC)    12.8 K/mm3      3.5-11.0         

 

                RED BLOOD CELL (test code=RBC)    3.27 M/mm3      4.70-6.10        

 

                HEMOGLOBIN (test code=HGB)    9.1 G/DL        10.4-14.9        

 

                HEMATOCRIT (test code=HCT)    25.7 %          31.5-44.1        

 

                MEAN CELL VOLUME (test code=MCV)    78.6 Fl         84.5-98.6        

 

                MEAN CELL HGB (test code=MCH)    27.8 pg         27.0-34.2        

 

                MEAN CELL HGB CONCETRATION (test code=MCHC)    35.4 G/DL       31.5-34.0        

 

                RED CELL DISTRIBUTION WIDTH (test code=RDW)    20.0 SD         11.5-14.5        

 

                PLATELET COUNT (test code=PLT)    234.0 K/mm3     150-450          

 

                MEAN PLATELET VOLUME (test code=MPV)    11.60 fL        7.0-10.5         

 

                NEUTROPHIL % (test code=NT%)     %              40-76            

 

                LYMPHOCYTE % (test code=LY%)     %              20.5-51.1        

 

                MONOCYTE % (test code=MO%)     %              1.7-9.3          

 

                EOSINOPHIL % (test code=EO%)     %              0.0-6.0          

 

                BASOPHIL % (test code=BA%)     %              0.0-2.0          

 

                NEUTROPHIL # (test code=NT#)     K/mm3          1.8-7.6          

 

                LYMPHOCYTE # (test code=LY#)     K/mm3          0.6-3.2          

 

                MONOCYTE # (test code=MO#)     K/mm3          0.3-1.1          

 

                EOSINOPHIL # (test code=EO#)     K/mm3          0.0-0.4          

 

                BASOPHIL # (test code=BA#)     K/mm3          0.0-0.1          

 

                MANUAL DIFF REQUIRED (test code=MDIFF)     DIFF/SCN       CRITERIA         





NEUTROPHIL CYTOPLASMIC [id number] 15:09:00* 





                Test Item       Value           Reference Range    Comments

 

                AB ANTI-NEUTROPHIL CYTO C (test code=NEUTCABC)    <1:20 titer     Neg:<1:20        

 

                AB ANTI-NEUTROPHIL CYTO P (test code=NEUTCAB-P)    <1:20 titer     Neg:<1:20       The presence

 of positive fluorescence exhibiting P-ANCA orC-ANCA patterns alone is not 
specific for the diagnosis ofWegener's Granulomatosis (WG) or microscopic 
polyangiitis.Decisions about treatment should not be based solely onANCA IFA 
results.  The [employer] ANCA Group Consensusrecommends follow up testing of 
positive sera with both WA-3 and MPO-ANCA enzyme immunoassays. As many as 5% 
serumsamples are positive only by EIA. Ref. AM J Clin [id number];111:507-513.

 

                ATYPICAL ANCA (test code=ANCACOM)    <1:20 titer     Neg:<1:20       The atypical pANCA pattern

 has been observed in asignificant percentage of patients with ulcerative 
colitis,primary sclerosing cholangitis and autoimmune hepatitis.Performed At: 
[facility] [id number] Sanjai MD 
Ph:[phone]





COMPLEMENT [id number] 15:09:00* 





                Test Item       Value           Reference Range    Comments

 

                COMPLEMENT C3 (test code=COMC3)    87 mg/dL                  Performed At:  [facility] [id number] CHANG MD Ph:[phone]





COMPLEMENT [id number] 15:09:00* 





                Test Item       Value           Reference Range    Comments

 

                COMPLEMENT C4 (test code=COMC4)    4 mg/dL         14-44            





NEUTROPHIL CYTOPLASMIC [id number] 15:09:00* 





                Test Item       Value           Reference Range    Comments

 

                AB ANTI-NEUTROPHIL CYTO C (test code=NEUTCABC)    <1:20 titer     Neg:<1:20        

 

                AB ANTI-NEUTROPHIL CYTO P (test code=NEUTCAB-P)    <1:20 titer     Neg:<1:20       The presence

 of positive fluorescence exhibiting P-ANCA orC-ANCA patterns alone is not 
specific for the diagnosis ofWegener's Granulomatosis (WG) or microscopic 
polyangiitis.Decisions about treatment should not be based solely onANCA IFA 
results.  The [employer] ANCA Group Consensusrecommends follow up testing of 
positive sera with both WA-3 and MPO-ANCA enzyme immunoassays. As many as 5% 
serumsamples are positive only by EIA. Ref. AM J Clin [id number];111:507-513.

 

                ATYPICAL ANCA (test code=ANCACOM)    <1:20 titer     Neg:<1:20       The atypical pANCA pattern

 has been observed in asignificant percentage of patients with ulcerative 
colitis,primary sclerosing cholangitis and autoimmune hepatitis.Performed At: 
[facility] [id number] Laughlin MD 
Ph:[phone]





COMPLEMENT [id number] 15:09:00* 





                Test Item       Value           Reference Range    Comments

 

                COMPLEMENT C3 (test code=COMC3)    87 mg/dL                  Performed At:  [facility] [id number] CHANG MD Ph:[phone]





COMPLEMENT [id number] 15:09:00* 





                Test Item       Value           Reference Range    Comments

 

                COMPLEMENT C4 (test code=COMC4)    4 mg/dL         14-44            





ANTINUCLEAR ANTIBODIES [id number] 14:08:00* 





                Test Item       Value           Reference Range    Comments

 

                MARGARET TITER (test code=ANATITR)    Negative        ()                                                 

  Negative   <1:80                                     Borderline  1:80         
                           Positive   >1:80Performed At:  [facility] [id number] CHANG MD Ph:[phone]





AG HEPATITIS B [id number] 14:08:00* 





                Test Item       Value           Reference Range    Comments

 

                AG HEPATITIS B SURFACE (test code=HBSAG)    Negative        Negative        Performed At:  [facility] [id number] CHANG MD Ph:[phone]





AB HEPATITIS  14:08:00* 





                Test Item       Value           Reference Range    Comments

 

                AB HEPATITIS C (test code=HCVAB)    0.1             0.0-0.9         INFCE Result Units: s/co ratio  

                                Negative:     < 0.8                             
Indeterminate: 0.8 - 0.9                                  Positive:     > 0.9 
The CDC recommends that a positive HCV antibody result be followed up with a HCV
Nucleic Acid Amplification test (192172).





NEUTROPHIL CYTOPLASMIC [id number] 14:08:00* 





                Test Item       Value           Reference Range    Comments

 

                AB ANTI-NEUTROPHIL CYTO C (test code=NEUTCABC)    <1:20 titer     Neg:<1:20        

 

                AB ANTI-NEUTROPHIL CYTO P (test code=NEUTCAB-P)    <1:20 titer     Neg:<1:20       The presence

 of positive fluorescence exhibiting P-ANCA orC-ANCA patterns alone is not 
specific for the diagnosis ofWegener's Granulomatosis (WG) or microscopic 
polyangiitis.Decisions about treatment should not be based solely onANCA IFA 
results.  The International ANCA Group Consensusrecommends follow up testing of 
positive sera with both WA-3 and MPO-ANCA enzyme immunoassays. As many as 5% 
serumsamples are positive only by EIA. Ref. AM J Clin [id number];111:507-513.

 

                ATYPICAL ANCA (test code=ANCACOM)    <1:20 titer     Neg:<1:20       The atypical pANCA pattern

 has been observed in asignificant percentage of patients with ulcerative 
colitis,primary sclerosing cholangitis and autoimmune hepatitis.Performed At: 
[facility] [id number] Sanjai MD 
Ph:[phone]





AB DNA DOUBLE [id number] 14:08:00* 





                Test Item       Value           Reference Range    Comments

 

                AB DNA DOUBLE STRAND (test code=DNADSAB)    1 IU/mL         0-9                                     

           Negative      <5                                   Equivocal  5 - 9  
                                Positive      >9Performed At:  [facility] 
[address] [id number] CHANG MD Ph:[phone]





COMPLEMENT [id number] 14:08:00* 





                Test Item       Value           Reference Range    Comments

 

                COMPLEMENT C3 (test code=COMC3)    67 mg/dL                  Performed At:  [facility] [address] [id number] CHANG MD Ph:[phone]





COMPLEMENT [id number] 14:08:00* 





                Test Item       Value           Reference Range    Comments

 

                COMPLEMENT C4 (test code=COMC4)    3 mg/dL         14-44            





NEUTROPHIL CYTOPLASMIC [id number] 08:09:00* 





                Test Item       Value           Reference Range    Comments

 

                AB ANTI-NEUTROPHIL CYTO C (test code=NEUTCABC)                                     

 

                AB ANTI-NEUTROPHIL CYTO P (test code=NEUTCAB-P)                                     

 

                ATYPICAL ANCA (test code=ANCACOM)                                     





COMPLEMENT [id number] 08:09:00* 





                Test Item       Value           Reference Range    Comments

 

                COMPLEMENT C3 (test code=COMC3)                                     





COMPLEMENT [id number] 08:09:00* 





                Test Item       Value           Reference Range    Comments

 

                COMPLEMENT C4 (test code=COMC4)    4 mg/dL         14-44            





NEUTROPHIL CYTOPLASMIC [id number] 08:09:00* 





                Test Item       Value           Reference Range    Comments

 

                AB ANTI-NEUTROPHIL CYTO C (test code=NEUTCABC)                                     

 

                AB ANTI-NEUTROPHIL CYTO P (test code=NEUTCAB-P)                                     

 

                ATYPICAL ANCA (test code=ANCACOM)                                     





COMPLEMENT [id number] 08:09:00* 





                Test Item       Value           Reference Range    Comments

 

                COMPLEMENT C3 (test code=COMC3)    87 mg/dL                  Performed At:  [facility] [id number] Kyle L MD Ph:[phone]





COMPLEMENT [id number] 08:09:00* 





                Test Item       Value           Reference Range    Comments

 

                COMPLEMENT C4 (test code=COMC4)    4 mg/dL         14-44            





GLUCOSE BEDSIDE [id number] 07:26:00* 





                Test Item       Value           Reference Range    Comments

 

                GLUCOSE BEDSIDE TESTING (test code=GLUBED)    135 mg/dL                  





CBC W/AUTO [id number] 04:42:00* 





                Test Item       Value           Reference Range    Comments

 

                WHITE BLOOD CELL (test code=WBC)    13.7 K/mm3      3.5-11.0         

 

                RED BLOOD CELL (test code=RBC)    3.35 M/mm3      4.70-6.10        

 

                HEMOGLOBIN (test code=HGB)    9.1 G/DL        10.4-14.9        

 

                HEMATOCRIT (test code=HCT)    26.0 %          31.5-44.1        

 

                MEAN CELL VOLUME (test code=MCV)    77.6 Fl         84.5-98.6        

 

                MEAN CELL HGB (test code=MCH)    27.2 pg         27.0-34.2        

 

                MEAN CELL HGB CONCETRATION (test code=MCHC)    35.0 G/DL       31.5-34.0        

 

                RED CELL DISTRIBUTION WIDTH (test code=RDW)    19.5 SD         11.5-14.5        

 

                PLATELET COUNT (test code=PLT)    202.0 K/mm3     150-450          

 

                MEAN PLATELET VOLUME (test code=MPV)    10.40 fL        7.0-10.5         

 

                MANUAL DIFF REQUIRED (test code=MDIFF)    YES DIFF/SCN    CRITERIA         





WBC [id number] 04:42:00* 





                Test Item       Value           Reference Range    Comments

 

                SEGMENTED NEUTROPHILS (test code=SEG)     %              40-75            

 

                LYMPHOCYTE (test code=LYMPH)     %              18.7-40.6        





CBC W/AUTO [id number] 04:42:00* 





                Test Item       Value           Reference Range    Comments

 

                WHITE BLOOD CELL (test code=WBC)    13.7 K/mm3      3.5-11.0         

 

                RED BLOOD CELL (test code=RBC)    3.35 M/mm3      4.70-6.10        

 

                HEMOGLOBIN (test code=HGB)    9.1 G/DL        10.4-14.9        

 

                HEMATOCRIT (test code=HCT)    26.0 %          31.5-44.1        

 

                MEAN CELL VOLUME (test code=MCV)    77.6 Fl         84.5-98.6        

 

                MEAN CELL HGB (test code=MCH)    27.2 pg         27.0-34.2        

 

                MEAN CELL HGB CONCETRATION (test code=MCHC)    35.0 G/DL       31.5-34.0        

 

                RED CELL DISTRIBUTION WIDTH (test code=RDW)    19.5 SD         11.5-14.5        

 

                PLATELET COUNT (test code=PLT)    202.0 K/mm3     150-450          

 

                MEAN PLATELET VOLUME (test code=MPV)    10.40 fL        7.0-10.5         

 

                MANUAL DIFF REQUIRED (test code=MDIFF)    YES DIFF/SCN    CRITERIA         





WBC [id number] 04:42:00* 





                Test Item       Value           Reference Range    Comments

 

                SEGMENTED NEUTROPHILS (test code=SEG)    78 %            40-75            

 

                LYMPHOCYTE (test code=LYMPH)    13 %            18.7-40.6        

 

                MONOCYTE (test code=MON)    8 %             3.8-11.4         

 

                EOSINOPHIL (test code=EOS)    1 %             0.0-4.1          

 

                ANISOCYTOSIS (test code=ANISO)    1+              NONE             

 

                MICROCYTOSIS (test code=MICR)    1+ ON SCAN      NONE             

 

                TARGET CELLS (test code=TGT)    3+ ON SCAN      NONE             

 

                SCHISTOCYTES (test code=ANDREW)    1+ ON SCAN      NONE             

 

                PLATELET ESTIMATE (test code=PLTEST)    ADEQUATE THOUSAND    ADEQUATE         

 

                PLATELET MORPHOLOGY (test code=PLTMORPH)    NORMAL                           





CBC W/AUTO [id number] 04:42:00* 





                Test Item       Value           Reference Range    Comments

 

                WHITE BLOOD CELL (test code=WBC)    13.7 K/mm3      3.5-11.0         

 

                RED BLOOD CELL (test code=RBC)    3.35 M/mm3      4.70-6.10        

 

                HEMOGLOBIN (test code=HGB)    9.1 G/DL        10.4-14.9        

 

                HEMATOCRIT (test code=HCT)    26.0 %          31.5-44.1        

 

                MEAN CELL VOLUME (test code=MCV)    77.6 Fl         84.5-98.6        

 

                MEAN CELL HGB (test code=MCH)    27.2 pg         27.0-34.2        

 

                MEAN CELL HGB CONCETRATION (test code=MCHC)    35.0 G/DL       31.5-34.0        

 

                RED CELL DISTRIBUTION WIDTH (test code=RDW)    19.5 SD         11.5-14.5        

 

                PLATELET COUNT (test code=PLT)    202.0 K/mm3     150-450          

 

                MEAN PLATELET VOLUME (test code=MPV)    10.40 fL        7.0-10.5         

 

                MANUAL DIFF REQUIRED (test code=MDIFF)    YES DIFF/SCN    CRITERIA         





WBC [id number] 04:42:00* 





                Test Item       Value           Reference Range    Comments

 

                SEGMENTED NEUTROPHILS (test code=SEG)     %              40-75            

 

                LYMPHOCYTE (test code=LYMPH)     %              18.7-40.6        





COMPREHENSIVE METABOLIC [id number] 04:34:00* 





                Test Item       Value           Reference Range    Comments

 

                SODIUM (test code=NA)    137 mmol/L      134-147          

 

                POTASSIUM (test code=K)    4.5 mmol/L      3.4-5.0          

 

                CHLORIDE (test code=CL)    104 mmol/L      100-108          

 

                CARBON DIOXIDE (test code=CO2)    25 mmol/L       21-32            

 

                ANION GAP (test code=GAP)    8.0 GAP calc    4.0-15.0         

 

                GLUCOSE (test code=GLU)    155 MG/DL                  

 

                BLOOD UREA NITROGEN (test code=BUN)    35 MG/DL        7-18             

 

                GLOMERULAR FILTRATION RATE (test code=GFR)    19 estGFR       >60              

 

                CREATININE (test code=CREAT)    3.2 MG/DL       0.6-1.0          

 

                TOTAL PROTEIN (test code=PROT)    6.5 G/DL        6.4-8.2          

 

                ALBUMIN (test code=ALB)    1.9 G/DL        3.4-5.0          

 

                GLOBULIN (test code=GLOB)    4.6 GM/dL                        

 

                ALBUMIN/GLOBULIN RATIO (test code=A/G)    0.4 RATIO       1.2-2.2          

 

                CALCIUM (test code=CA)    8.5 MG/DL       8.5-10.1         

 

                BILIRUBIN TOTAL (test code=BILT)    2.10 MG/DL      0.2-1.2          

 

                SGOT/AST (test code=AST)    992 Unit/L      15-37            

 

                SGPT/ALT (test code=ALT)    1114 Unit/L     12-78            

 

                ALKALINE PHOSPHATASE TOTAL (test code=ALKP)    304 Unit/L                 





CBC W/AUTO [id number] 04:22:00* 





                Test Item       Value           Reference Range    Comments

 

                WHITE BLOOD CELL (test code=WBC)    13.7 K/mm3      3.5-11.0         

 

                RED BLOOD CELL (test code=RBC)    3.35 M/mm3      4.70-6.10        

 

                HEMOGLOBIN (test code=HGB)    9.1 G/DL        10.4-14.9        

 

                HEMATOCRIT (test code=HCT)    26.0 %          31.5-44.1        

 

                MEAN CELL VOLUME (test code=MCV)    77.6 Fl         84.5-98.6        

 

                MEAN CELL HGB (test code=MCH)    27.2 pg         27.0-34.2        

 

                MEAN CELL HGB CONCETRATION (test code=MCHC)    35.0 G/DL       31.5-34.0        

 

                RED CELL DISTRIBUTION WIDTH (test code=RDW)    19.5 SD         11.5-14.5        

 

                PLATELET COUNT (test code=PLT)    202.0 K/mm3     150-450          

 

                MEAN PLATELET VOLUME (test code=MPV)    10.40 fL        7.0-10.5         

 

                NEUTROPHIL % (test code=NT%)     %              40-76            

 

                LYMPHOCYTE % (test code=LY%)     %              20.5-51.1        

 

                MONOCYTE % (test code=MO%)     %              1.7-9.3          

 

                EOSINOPHIL % (test code=EO%)     %              0.0-6.0          

 

                BASOPHIL % (test code=BA%)     %              0.0-2.0          

 

                NEUTROPHIL # (test code=NT#)     K/mm3          1.8-7.6          

 

                LYMPHOCYTE # (test code=LY#)     K/mm3          0.6-3.2          

 

                MONOCYTE # (test code=MO#)     K/mm3          0.3-1.1          

 

                EOSINOPHIL # (test code=EO#)     K/mm3          0.0-0.4          

 

                BASOPHIL # (test code=BA#)     K/mm3          0.0-0.1          

 

                MANUAL DIFF REQUIRED (test code=MDIFF)     DIFF/SCN       CRITERIA         





CBC W/AUTO [id number] 06:35:00* 





                Test Item       Value           Reference Range    Comments

 

                WHITE BLOOD CELL (test code=WBC)    17.8 K/mm3      3.5-11.0         

 

                RED BLOOD CELL (test code=RBC)    3.46 M/mm3      4.70-6.10        

 

                HEMOGLOBIN (test code=HGB)    9.4 G/DL        10.4-14.9        

 

                HEMATOCRIT (test code=HCT)    26.8 %          31.5-44.1        

 

                MEAN CELL VOLUME (test code=MCV)    77.5 Fl         84.5-98.6        

 

                MEAN CELL HGB (test code=MCH)    27.2 pg         27.0-34.2        

 

                MEAN CELL HGB CONCETRATION (test code=MCHC)    35.1 G/DL       31.5-34.0        

 

                RED CELL DISTRIBUTION WIDTH (test code=RDW)    19.0 SD         11.5-14.5        

 

                PLATELET COUNT (test code=PLT)    190.0 K/mm3     150-450          

 

                NEUTROPHIL % (test code=NT%)    72.4 %          40-76            

 

                LYMPHOCYTE % (test code=LY%)    14.1 %          20.5-51.1        

 

                MONOCYTE % (test code=MO%)    11.1 %          1.7-9.3          

 

                EOSINOPHIL % (test code=EO%)    2.2 %           0.0-6.0          

 

                BASOPHIL % (test code=BA%)    0.2 %           0.0-2.0          

 

                NEUTROPHIL # (test code=NT#)    12.90 K/mm3     1.8-7.6          

 

                LYMPHOCYTE # (test code=LY#)    2.5 K/mm3       0.6-3.2          

 

                MONOCYTE # (test code=MO#)    2.0 K/mm3       0.3-1.1          

 

                EOSINOPHIL # (test code=EO#)    0.4 K/mm3       0.0-0.4          

 

                BASOPHIL # (test code=BA#)    0.0 K/mm3       0.0-0.1          

 

                MANUAL DIFF REQUIRED (test code=MDIFF)    NO DIFF/SCN     CRITERIA        SLIDE REVIEW CONSISTANT

 WITH AUTO DIFFERENTIAL.





CBC W/AUTO [id number] 06:35:00* 





                Test Item       Value           Reference Range    Comments

 

                WHITE BLOOD CELL (test code=WBC)    17.8 K/mm3      3.5-11.0         

 

                RED BLOOD CELL (test code=RBC)    3.46 M/mm3      4.70-6.10        

 

                HEMOGLOBIN (test code=HGB)    9.4 G/DL        10.4-14.9        

 

                HEMATOCRIT (test code=HCT)    26.8 %          31.5-44.1        

 

                MEAN CELL VOLUME (test code=MCV)    77.5 Fl         84.5-98.6        

 

                MEAN CELL HGB (test code=MCH)    27.2 pg         27.0-34.2        

 

                MEAN CELL HGB CONCETRATION (test code=MCHC)    35.1 G/DL       31.5-34.0        

 

                RED CELL DISTRIBUTION WIDTH (test code=RDW)    19.0 SD         11.5-14.5        

 

                PLATELET COUNT (test code=PLT)    190.0 K/mm3     150-450          

 

                NEUTROPHIL % (test code=NT%)    72.4 %          40-76            

 

                LYMPHOCYTE % (test code=LY%)    14.1 %          20.5-51.1        

 

                MONOCYTE % (test code=MO%)    11.1 %          1.7-9.3          

 

                EOSINOPHIL % (test code=EO%)    2.2 %           0.0-6.0          

 

                BASOPHIL % (test code=BA%)    0.2 %           0.0-2.0          

 

                NEUTROPHIL # (test code=NT#)    12.90 K/mm3     1.8-7.6          

 

                LYMPHOCYTE # (test code=LY#)    2.5 K/mm3       0.6-3.2          

 

                MONOCYTE # (test code=MO#)    2.0 K/mm3       0.3-1.1          

 

                EOSINOPHIL # (test code=EO#)    0.4 K/mm3       0.0-0.4          

 

                BASOPHIL # (test code=BA#)    0.0 K/mm3       0.0-0.1          

 

                MANUAL DIFF REQUIRED (test code=MDIFF)    NO DIFF/SCN     CRITERIA        SLIDE REVIEW CONSISTANT

 WITH AUTO DIFFERENTIAL.





RBC [id number] 06:35:00* 





                Test Item       Value           Reference Range    Comments

 

                PLATELET ESTIMATE (test code=PLTEST)    ADEQUATE THOUSAND    ADEQUATE         

 

                PLATELET MORPHOLOGY (test code=PLTMORPH)    NORMAL                           





CBC W/AUTO [id number] 06:35:00* 





                Test Item       Value           Reference Range    Comments

 

                WHITE BLOOD CELL (test code=WBC)    17.8 K/mm3      3.5-11.0         

 

                RED BLOOD CELL (test code=RBC)    3.46 M/mm3      4.70-6.10        

 

                HEMOGLOBIN (test code=HGB)    9.4 G/DL        10.4-14.9        

 

                HEMATOCRIT (test code=HCT)    26.8 %          31.5-44.1        

 

                MEAN CELL VOLUME (test code=MCV)    77.5 Fl         84.5-98.6        

 

                MEAN CELL HGB (test code=MCH)    27.2 pg         27.0-34.2        

 

                MEAN CELL HGB CONCETRATION (test code=MCHC)    35.1 G/DL       31.5-34.0        

 

                RED CELL DISTRIBUTION WIDTH (test code=RDW)    19.0 SD         11.5-14.5        

 

                PLATELET COUNT (test code=PLT)    190.0 K/mm3     150-450          

 

                NEUTROPHIL % (test code=NT%)    72.4 %          40-76            

 

                LYMPHOCYTE % (test code=LY%)    14.1 %          20.5-51.1        

 

                MONOCYTE % (test code=MO%)    11.1 %          1.7-9.3          

 

                EOSINOPHIL % (test code=EO%)    2.2 %           0.0-6.0          

 

                BASOPHIL % (test code=BA%)    0.2 %           0.0-2.0          

 

                NEUTROPHIL # (test code=NT#)    12.90 K/mm3     1.8-7.6          

 

                LYMPHOCYTE # (test code=LY#)    2.5 K/mm3       0.6-3.2          

 

                MONOCYTE # (test code=MO#)    2.0 K/mm3       0.3-1.1          

 

                EOSINOPHIL # (test code=EO#)    0.4 K/mm3       0.0-0.4          

 

                BASOPHIL # (test code=BA#)    0.0 K/mm3       0.0-0.1          

 

                MANUAL DIFF REQUIRED (test code=MDIFF)    NO DIFF/SCN     CRITERIA        SLIDE REVIEW CONSISTANT

 WITH AUTO DIFFERENTIAL.





CBC W/AUTO [id number] 06:04:00* 





                Test Item       Value           Reference Range    Comments

 

                WHITE BLOOD CELL (test code=WBC)    17.8 K/mm3      3.5-11.0         

 

                RED BLOOD CELL (test code=RBC)    3.46 M/mm3      4.70-6.10        

 

                HEMOGLOBIN (test code=HGB)    9.4 G/DL        10.4-14.9        

 

                HEMATOCRIT (test code=HCT)    26.8 %          31.5-44.1        

 

                MEAN CELL VOLUME (test code=MCV)    77.5 Fl         84.5-98.6        

 

                MEAN CELL HGB (test code=MCH)    27.2 pg         27.0-34.2        

 

                MEAN CELL HGB CONCETRATION (test code=MCHC)    35.1 G/DL       31.5-34.0        

 

                RED CELL DISTRIBUTION WIDTH (test code=RDW)    19.0 SD         11.5-14.5        

 

                PLATELET COUNT (test code=PLT)    190.0 K/mm3     150-450          

 

                NEUTROPHIL % (test code=NT%)     %              40-76            

 

                LYMPHOCYTE % (test code=LY%)     %              20.5-51.1        

 

                MONOCYTE % (test code=MO%)     %              1.7-9.3          

 

                EOSINOPHIL % (test code=EO%)     %              0.0-6.0          

 

                BASOPHIL % (test code=BA%)     %              0.0-2.0          

 

                NEUTROPHIL # (test code=NT#)     K/mm3          1.8-7.6          

 

                LYMPHOCYTE # (test code=LY#)     K/mm3          0.6-3.2          

 

                MONOCYTE # (test code=MO#)     K/mm3          0.3-1.1          

 

                EOSINOPHIL # (test code=EO#)     K/mm3          0.0-0.4          

 

                BASOPHIL # (test code=BA#)     K/mm3          0.0-0.1          

 

                MANUAL DIFF REQUIRED (test code=MDIFF)     DIFF/SCN       CRITERIA         





BASIC METABOLIC [id number] 05:57:00* 





                Test Item       Value           Reference Range    Comments

 

                SODIUM (test code=NA)    136 mmol/L      134-147          

 

                POTASSIUM (test code=K)    3.9 mmol/L      3.4-5.0          

 

                CHLORIDE (test code=CL)    103 mmol/L      100-108          

 

                CARBON DIOXIDE (test code=CO2)    23 mmol/L       21-32            

 

                ANION GAP (test code=GAP)    10.0 GAP calc    4.0-15.0         

 

                GLUCOSE (test code=GLU)    195 MG/DL                  

 

                BLOOD UREA NITROGEN (test code=BUN)    43 MG/DL        7-18             

 

                GLOMERULAR FILTRATION RATE (test code=GFR)    15 estGFR       >60              

 

                CREATININE (test code=CREAT)    4.0 MG/DL       0.6-1.0          

 

                CALCIUM (test code=CA)    8.1 MG/DL       8.5-10.1         





CBC W/AUTO [id number] 06:15:00* 





                Test Item       Value           Reference Range    Comments

 

                WHITE BLOOD CELL (test code=WBC)    16.5 K/mm3      3.5-11.0         

 

                RED BLOOD CELL (test code=RBC)    3.75 M/mm3      4.70-6.10        

 

                HEMOGLOBIN (test code=HGB)    10.2 G/DL       10.4-14.9        

 

                HEMATOCRIT (test code=HCT)    28.5 %          31.5-44.1        

 

                MEAN CELL VOLUME (test code=MCV)    76.0 Fl         84.5-98.6        

 

                MEAN CELL HGB (test code=MCH)    27.2 pg         27.0-34.2        

 

                MEAN CELL HGB CONCETRATION (test code=MCHC)    35.8 G/DL       31.5-34.0        

 

                RED CELL DISTRIBUTION WIDTH (test code=RDW)    18.7 SD         11.5-14.5        

 

                PLATELET COUNT (test code=PLT)    176.0 K/mm3     150-450          

 

                NEUTROPHIL % (test code=NT%)    73.2 %          40-76            

 

                LYMPHOCYTE % (test code=LY%)    13.7 %          20.5-51.1        

 

                MONOCYTE % (test code=MO%)    10.1 %          1.7-9.3          

 

                EOSINOPHIL % (test code=EO%)    2.8 %           0.0-6.0          

 

                BASOPHIL % (test code=BA%)    0.2 %           0.0-2.0          

 

                NEUTROPHIL # (test code=NT#)    12.05 K/mm3     1.8-7.6          

 

                LYMPHOCYTE # (test code=LY#)    2.3 K/mm3       0.6-3.2          

 

                MONOCYTE # (test code=MO#)    1.7 K/mm3       0.3-1.1          

 

                EOSINOPHIL # (test code=EO#)    0.5 K/mm3       0.0-0.4          

 

                BASOPHIL # (test code=BA#)    0.0 K/mm3       0.0-0.1          

 

                MANUAL DIFF REQUIRED (test code=MDIFF)    NO DIFF/SCN     CRITERIA        SLIDE REVIEW CONSISTANT

 WITH AUTO DIFFERENTIAL.





RBC [id number] 06:15:00* 





                Test Item       Value           Reference Range    Comments

 

                PLATELET ESTIMATE (test code=PLTEST)    ADEQUATE THOUSAND    ADEQUATE         

 

                PLATELET MORPHOLOGY (test code=PLTMORPH)    NORMAL                           





CBC W/AUTO [id number] 06:14:00* 





                Test Item       Value           Reference Range    Comments

 

                WHITE BLOOD CELL (test code=WBC)    16.5 K/mm3      3.5-11.0         

 

                RED BLOOD CELL (test code=RBC)    3.75 M/mm3      4.70-6.10        

 

                HEMOGLOBIN (test code=HGB)    10.2 G/DL       10.4-14.9        

 

                HEMATOCRIT (test code=HCT)    28.5 %          31.5-44.1        

 

                MEAN CELL VOLUME (test code=MCV)    76.0 Fl         84.5-98.6        

 

                MEAN CELL HGB (test code=MCH)    27.2 pg         27.0-34.2        

 

                MEAN CELL HGB CONCETRATION (test code=MCHC)    35.8 G/DL       31.5-34.0        

 

                RED CELL DISTRIBUTION WIDTH (test code=RDW)    18.7 SD         11.5-14.5        

 

                PLATELET COUNT (test code=PLT)    176.0 K/mm3     150-450          

 

                NEUTROPHIL % (test code=NT%)    73.2 %          40-76            

 

                LYMPHOCYTE % (test code=LY%)    13.7 %          20.5-51.1        

 

                MONOCYTE % (test code=MO%)    10.1 %          1.7-9.3          

 

                EOSINOPHIL % (test code=EO%)    2.8 %           0.0-6.0          

 

                BASOPHIL % (test code=BA%)    0.2 %           0.0-2.0          

 

                NEUTROPHIL # (test code=NT#)    12.05 K/mm3     1.8-7.6          

 

                LYMPHOCYTE # (test code=LY#)    2.3 K/mm3       0.6-3.2          

 

                MONOCYTE # (test code=MO#)    1.7 K/mm3       0.3-1.1          

 

                EOSINOPHIL # (test code=EO#)    0.5 K/mm3       0.0-0.4          

 

                BASOPHIL # (test code=BA#)    0.0 K/mm3       0.0-0.1          

 

                MANUAL DIFF REQUIRED (test code=MDIFF)    NO DIFF/SCN     CRITERIA        SLIDE REVIEW CONSISTANT

 WITH AUTO DIFFERENTIAL.





CBC W/AUTO [id number] 06:14:00* 





                Test Item       Value           Reference Range    Comments

 

                WHITE BLOOD CELL (test code=WBC)    16.5 K/mm3      3.5-11.0         

 

                RED BLOOD CELL (test code=RBC)    3.75 M/mm3      4.70-6.10        

 

                HEMOGLOBIN (test code=HGB)    10.2 G/DL       10.4-14.9        

 

                HEMATOCRIT (test code=HCT)    28.5 %          31.5-44.1        

 

                MEAN CELL VOLUME (test code=MCV)    76.0 Fl         84.5-98.6        

 

                MEAN CELL HGB (test code=MCH)    27.2 pg         27.0-34.2        

 

                MEAN CELL HGB CONCETRATION (test code=MCHC)    35.8 G/DL       31.5-34.0        

 

                RED CELL DISTRIBUTION WIDTH (test code=RDW)    18.7 SD         11.5-14.5        

 

                PLATELET COUNT (test code=PLT)    176.0 K/mm3     150-450          

 

                NEUTROPHIL % (test code=NT%)    73.2 %          40-76            

 

                LYMPHOCYTE % (test code=LY%)    13.7 %          20.5-51.1        

 

                MONOCYTE % (test code=MO%)    10.1 %          1.7-9.3          

 

                EOSINOPHIL % (test code=EO%)    2.8 %           0.0-6.0          

 

                BASOPHIL % (test code=BA%)    0.2 %           0.0-2.0          

 

                NEUTROPHIL # (test code=NT#)    12.05 K/mm3     1.8-7.6          

 

                LYMPHOCYTE # (test code=LY#)    2.3 K/mm3       0.6-3.2          

 

                MONOCYTE # (test code=MO#)    1.7 K/mm3       0.3-1.1          

 

                EOSINOPHIL # (test code=EO#)    0.5 K/mm3       0.0-0.4          

 

                BASOPHIL # (test code=BA#)    0.0 K/mm3       0.0-0.1          

 

                MANUAL DIFF REQUIRED (test code=MDIFF)    NO DIFF/SCN     CRITERIA        SLIDE REVIEW CONSISTANT

 WITH AUTO DIFFERENTIAL.





BASIC METABOLIC [id number] 05:42:00* 





                Test Item       Value           Reference Range    Comments

 

                SODIUM (test code=NA)    134 mmol/L      134-147          

 

                POTASSIUM (test code=K)    4.6 mmol/L      3.4-5.0          

 

                CHLORIDE (test code=CL)    100 mmol/L      100-108          

 

                CARBON DIOXIDE (test code=CO2)    23 mmol/L       21-32            

 

                ANION GAP (test code=GAP)    11.0 GAP calc    4.0-15.0         

 

                GLUCOSE (test code=GLU)    143 MG/DL                  

 

                BLOOD UREA NITROGEN (test code=BUN)    69 MG/DL        7-18             

 

                GLOMERULAR FILTRATION RATE (test code=GFR)    10 estGFR       >60              

 

                CREATININE (test code=CREAT)    5.6 MG/DL       0.6-1.0          

 

                CALCIUM (test code=CA)    8.1 MG/DL       8.5-10.1         





[id number] 05:42:00* 





                Test Item       Value           Reference Range    Comments

 

                PHOSPHOROUS (test code=PHOS)    5.9 MG/DL       2.5-4.9          





[id number] 05:42:00* 





                Test Item       Value           Reference Range    Comments

 

                MAGNESIUM (test code=MAG)    2.3 MG/DL       1.8-2.4          





CBC W/AUTO [id number] 05:37:00* 





                Test Item       Value           Reference Range    Comments

 

                WHITE BLOOD CELL (test code=WBC)    16.5 K/mm3      3.5-11.0         

 

                RED BLOOD CELL (test code=RBC)    3.75 M/mm3      4.70-6.10        

 

                HEMOGLOBIN (test code=HGB)    10.2 G/DL       10.4-14.9        

 

                HEMATOCRIT (test code=HCT)    28.5 %          31.5-44.1        

 

                MEAN CELL VOLUME (test code=MCV)    76.0 Fl         84.5-98.6        

 

                MEAN CELL HGB (test code=MCH)    27.2 pg         27.0-34.2        

 

                MEAN CELL HGB CONCETRATION (test code=MCHC)    35.8 G/DL       31.5-34.0        

 

                RED CELL DISTRIBUTION WIDTH (test code=RDW)    18.7 SD         11.5-14.5        

 

                PLATELET COUNT (test code=PLT)    176.0 K/mm3     150-450          

 

                NEUTROPHIL % (test code=NT%)     %              40-76            

 

                LYMPHOCYTE % (test code=LY%)     %              20.5-51.1        

 

                MONOCYTE % (test code=MO%)     %              1.7-9.3          

 

                EOSINOPHIL % (test code=EO%)     %              0.0-6.0          

 

                BASOPHIL % (test code=BA%)     %              0.0-2.0          

 

                NEUTROPHIL # (test code=NT#)     K/mm3          1.8-7.6          

 

                LYMPHOCYTE # (test code=LY#)     K/mm3          0.6-3.2          

 

                MONOCYTE # (test code=MO#)     K/mm3          0.3-1.1          

 

                EOSINOPHIL # (test code=EO#)     K/mm3          0.0-0.4          

 

                BASOPHIL # (test code=BA#)     K/mm3          0.0-0.1          

 

                MANUAL DIFF REQUIRED (test code=MDIFF)     DIFF/SCN       CRITERIA         





UR SODIUM [id number] 14:08:00* 





                Test Item       Value           Reference Range    Comments

 

                UR SODIUM RANDOM (test code=JUNI)    27 MEQ/L        ()              The Reference Range and Method Performance

 specificationshave not been established for this fluid. The test resultshould 
be correlated into the clinical context forinterpretation.





UR POTASSIUM [id number] 14:08:00* 





                Test Item       Value           Reference Range    Comments

 

                UR POTASSIUM RANDOM (test code=KU)    67.8 MEQ/L      ()              The Reference Range and Method

 Performance specificationshave not been established for this fluid. The test 
resultshould be correlated into the clinical context forinterpretation.





UR CHLORIDE [id number] 14:08:00* 





                Test Item       Value           Reference Range    Comments

 

                UR CHLORIDE RANDOM (test code=CLU)    < 20 mmol/L     Not Estab.      Performed At: [facility]

 [address] [id number] CHANG MD Ph:[phone]





UR PROTEIN [id number] 14:08:00* 





                Test Item       Value           Reference Range    Comments

 

                UR PROTEIN TOTAL (test code=PROTU)    321.6 MG/DL     0.0-12.0         





UR CREATININE [id number] 14:08:00* 





                Test Item       Value           Reference Range    Comments

 

                UR CREATININE RANDOM (test code=CREATU)    195.0 MG/DL                





UR OSMOLALITY [id number] 14:08:00* 





                Test Item       Value           Reference Range    Comments

 

                UR OSMOLALITY RANDOM (test code=OSMOU)    323             ()              INFCE Result Units: mOsmol/kg  

                                24 hr :   300 -  900                            
     Random:    50 - 1400                                  After 12hr fluid     
                            restriction:    >850Performed At: [facility] 
[address] [id number] CHANG MD Ph:[phone]





UR SODIUM [id number] 12:08:00* 





                Test Item       Value           Reference Range    Comments

 

                UR SODIUM RANDOM (test code=JUNI)    27 MEQ/L        ()              The Reference Range and Method Performance

 specificationshave not been established for this fluid. The test resultshould 
be correlated into the clinical context forinterpretation.





UR POTASSIUM [id number] 12:08:00* 





                Test Item       Value           Reference Range    Comments

 

                UR POTASSIUM RANDOM (test code=KU)    67.8 MEQ/L      ()              The Reference Range and Method

 Performance specificationshave not been established for this fluid. The test 
resultshould be correlated into the clinical context forinterpretation.





UR CHLORIDE [id number] 12:08:00* 





                Test Item       Value           Reference Range    Comments

 

                UR CHLORIDE RANDOM (test code=CLU)     mmol/L         >10              





UR PROTEIN [id number] 12:08:00* 





                Test Item       Value           Reference Range    Comments

 

                UR PROTEIN TOTAL (test code=PROTU)    321.6 MG/DL     0.0-12.0         





UR CREATININE [id number] 12:08:00* 





                Test Item       Value           Reference Range    Comments

 

                UR CREATININE RANDOM (test code=CREATU)    195.0 MG/DL                





UR OSMOLALITY [id number] 12:08:00* 





                Test Item       Value           Reference Range    Comments

 

                UR OSMOLALITY RANDOM (test code=OSMOU)    323             ()              INFCE Result Units: mOsmol/kg  

                                24 hr :   300 -  900                            
     Random:    50 - 1400                                  After 12hr fluid     
                            restriction:    >850Performed At:  [facility] 
[address] [id number] CHANG MD Ph:[phone]





ANTINUCLEAR ANTIBODIES [id number] 10:08:00* 





                Test Item       Value           Reference Range    Comments

 

                MARGARET TITER (test code=ANATITR)    Negative        ()                                                 

  Negative   <1:80                                     Borderline  1:80         
                           Positive   >1:80Performed At:  [facility] [id number] CHANG MD Ph:[phone]





AG HEPATITIS B [id number] 10:08:00* 





                Test Item       Value           Reference Range    Comments

 

                AG HEPATITIS B SURFACE (test code=HBSAG)    Negative        Negative        Performed At:  [facility] [id number] CHANG MD Ph:[phone]





AB HEPATITIS  10:08:00* 





                Test Item       Value           Reference Range    Comments

 

                AB HEPATITIS C (test code=HCVAB)    0.1             0.0-0.9         INFCE Result Units: s/co ratio  

                                Negative:     < 0.8                             
Indeterminate: 0.8 - 0.9                                  Positive:     > 0.9 
The CDC recommends that a positive HCV antibody result be followed up with a HCV
Nucleic Acid Amplification test ([id number]).





NEUTROPHIL CYTOPLASMIC [id number] 10:08:00* 





                Test Item       Value           Reference Range    Comments

 

                AB ANTI-NEUTROPHIL CYTO C (test code=NEUTCABC)                                     

 

                AB ANTI-NEUTROPHIL CYTO P (test code=NEUTCAB-P)                                     

 

                ATYPICAL ANCA (test code=ANCACOM)                                     





AB DNA DOUBLE [id number] 10:08:00* 





                Test Item       Value           Reference Range    Comments

 

                AB DNA DOUBLE STRAND (test code=DNADSAB)    1 IU/mL         0-9                                     

           Negative      <5                                   Equivocal  5 - 9  
                                Positive      >9Performed At: [address] [id number] CHANG MD Ph:[phone]





COMPLEMENT [id number] 10:08:00* 





                Test Item       Value           Reference Range    Comments

 

                COMPLEMENT C3 (test code=COMC3)    67 mg/dL                  Performed At: [address] [id number] CHANG MD Ph:[phone]





COMPLEMENT [id number] 10:08:00* 





                Test Item       Value           Reference Range    Comments

 

                COMPLEMENT C4 (test code=COMC4)    3 mg/dL         14-44            





ANTINUCLEAR ANTIBODIES [id number] 09:09:00* 





                Test Item       Value           Reference Range    Comments

 

                MARGARET TITER (test code=ANATITR)                                     





AG HEPATITIS B [id number] 09:09:00* 





                Test Item       Value           Reference Range    Comments

 

                AG HEPATITIS B SURFACE (test code=HBSAG)    Negative        Negative        Performed At: [address] [id number] CHANG MD Ph:[phone]





AB HEPATITIS  09:09:00* 





                Test Item       Value           Reference Range    Comments

 

                AB HEPATITIS C (test code=HCVAB)    0.1             0.0-0.9         INFCE Result Units: s/co ratio  

                                Negative:     < 0.8                             
Indeterminate: 0.8 - 0.9                                  Positive:     > 0.9 
The CDC recommends that a positive HCV antibody result be followed up with a HCV
Nucleic Acid Amplification test ([id number]).





NEUTROPHIL CYTOPLASMIC [id number] 09:09:00* 





                Test Item       Value           Reference Range    Comments

 

                AB ANTI-NEUTROPHIL CYTO C (test code=NEUTCABC)                                     

 

                AB ANTI-NEUTROPHIL CYTO P (test code=NEUTCAB-P)                                     

 

                ATYPICAL ANCA (test code=ANCACOM)                                     





AB DNA DOUBLE [id number] 09:09:00* 





                Test Item       Value           Reference Range    Comments

 

                AB DNA DOUBLE STRAND (test code=DNADSAB)    1 IU/mL         0-9                                     

           Negative      <5                                   Equivocal  5 - 9  
                                Positive      >9Performed At: [address] [id number] CHANG MD Ph:[phone]





COMPLEMENT [id number] 09:09:00* 





                Test Item       Value           Reference Range    Comments

 

                COMPLEMENT C3 (test code=COMC3)    67 mg/dL                  Performed At:  [facility] [id number] CHANG MD Ph:[phone]





COMPLEMENT [id number] 09:09:00* 





                Test Item       Value           Reference Range    Comments

 

                COMPLEMENT C4 (test code=COMC4)    3 mg/dL         14-44            





ANTINUCLEAR ANTIBODIES [id number] 06:08:00* 





                Test Item       Value           Reference Range    Comments

 

                MARGARET TITER (test code=ANATITR)                                     





AG HEPATITIS B [id number] 06:08:00* 





                Test Item       Value           Reference Range    Comments

 

                AG HEPATITIS B SURFACE (test code=HBSAG)    Negative        Negative        Performed At: [address] [id number] Kyle L MD Ph:[phone]





AB HEPATITIS  06:08:00* 





                Test Item       Value           Reference Range    Comments

 

                AB HEPATITIS C (test code=HCVAB)    0.1             0.0-0.9         INFCE Result Units: s/co ratio  

                                Negative:     < 0.8                             
Indeterminate: 0.8 - 0.9                                  Positive:     > 0.9 
The CDC recommends that a positive HCV antibody result be followed up with a HCV
Nucleic Acid Amplification test ([id number]).





NEUTROPHIL CYTOPLASMIC [id number] 06:08:00* 





                Test Item       Value           Reference Range    Comments

 

                AB ANTI-NEUTROPHIL CYTO C (test code=NEUTCABC)                                     

 

                AB ANTI-NEUTROPHIL CYTO P (test code=NEUTCAB-P)                                     

 

                ATYPICAL ANCA (test code=ANCACOM)                                     





AB DNA DOUBLE [id number] 06:08:00* 





                Test Item       Value           Reference Range    Comments

 

                AB DNA DOUBLE STRAND (test code=DNADSAB)                                     





COMPLEMENT [id number] 06:08:00* 





                Test Item       Value           Reference Range    Comments

 

                COMPLEMENT C3 (test code=COMC3)                                     





COMPLEMENT [id number] 06:08:00* 





                Test Item       Value           Reference Range    Comments

 

                COMPLEMENT C4 (test code=COMC4)    3 mg/dL         14-44            





ANTINUCLEAR ANTIBODIES [id number] 06:08:00* 





                Test Item       Value           Reference Range    Comments

 

                MARGARET TITER (test code=ANATITR)                                     





AG HEPATITIS B [id number] 06:08:00* 





                Test Item       Value           Reference Range    Comments

 

                AG HEPATITIS B SURFACE (test code=HBSAG)    Negative        Negative        Performed At:  [facility]

 [address] [id number] Kyle L MD Ph:[phone]





AB HEPATITIS  06:08:00* 





                Test Item       Value           Reference Range    Comments

 

                AB HEPATITIS C (test code=HCVAB)    0.1             0.0-0.9         INFCE Result Units: s/co ratio  

                                Negative:     < 0.8                             
Indeterminate: 0.8 - 0.9                                  Positive:     > 0.9 
The CDC recommends that a positive HCV antibody result be followed up with a HCV
Nucleic Acid Amplification test ([id number]).





NEUTROPHIL CYTOPLASMIC [id number] 06:08:00* 





                Test Item       Value           Reference Range    Comments

 

                AB ANTI-NEUTROPHIL CYTO C (test code=NEUTCABC)                                     

 

                AB ANTI-NEUTROPHIL CYTO P (test code=NEUTCAB-P)                                     

 

                ATYPICAL ANCA (test code=ANCACOM)                                     





AB DNA DOUBLE [id number] 06:08:00* 





                Test Item       Value           Reference Range    Comments

 

                AB DNA DOUBLE STRAND (test code=DNADSAB)                                     





COMPLEMENT [id number] 06:08:00* 





                Test Item       Value           Reference Range    Comments

 

                COMPLEMENT C3 (test code=COMC3)    67 mg/dL                  Performed At: [facility] [address] [id number] CHANG MD Ph:[phone]





COMPLEMENT [id number] 06:08:00* 





                Test Item       Value           Reference Range    Comments

 

                COMPLEMENT C4 (test code=COMC4)    3 mg/dL         14-44            





CBC W/AUTO [id number] 05:55:00* 





                Test Item       Value           Reference Range    Comments

 

                WHITE BLOOD CELL (test code=WBC)    17.6 K/mm3      3.5-11.0         

 

                RED BLOOD CELL (test code=RBC)    3.87 M/mm3      4.70-6.10        

 

                HEMOGLOBIN (test code=HGB)    10.5 G/DL       10.4-14.9        

 

                HEMATOCRIT (test code=HCT)    29.3 %          31.5-44.1        

 

                MEAN CELL VOLUME (test code=MCV)    75.7 Fl         84.5-98.6        

 

                MEAN CELL HGB (test code=MCH)    27.1 pg         27.0-34.2        

 

                MEAN CELL HGB CONCETRATION (test code=MCHC)    35.8 G/DL       31.5-34.0        

 

                RED CELL DISTRIBUTION WIDTH (test code=RDW)    18.6 SD         11.5-14.5        

 

                PLATELET COUNT (test code=PLT)    179.0 K/mm3     150-450          

 

                NEUTROPHIL % (test code=NT%)    73.8 %          40-76            

 

                LYMPHOCYTE % (test code=LY%)    13.5 %          20.5-51.1        

 

                MONOCYTE % (test code=MO%)    10.6 %          1.7-9.3          

 

                EOSINOPHIL % (test code=EO%)    1.8 %           0.0-6.0          

 

                BASOPHIL % (test code=BA%)    0.3 %           0.0-2.0          

 

                NEUTROPHIL # (test code=NT#)    12.99 K/mm3     1.8-7.6          

 

                LYMPHOCYTE # (test code=LY#)    2.4 K/mm3       0.6-3.2          

 

                MONOCYTE # (test code=MO#)    1.9 K/mm3       0.3-1.1          

 

                EOSINOPHIL # (test code=EO#)    0.3 K/mm3       0.0-0.4          

 

                BASOPHIL # (test code=BA#)    0.1 K/mm3       0.0-0.1          

 

                MANUAL DIFF REQUIRED (test code=MDIFF)    NO DIFF/SCN     CRITERIA        SLIDE REVIEW CONSISTANT

 WITH AUTO DIFFERENTIAL.





BASIC METABOLIC [id number] 05:47:00* 





                Test Item       Value           Reference Range    Comments

 

                SODIUM (test code=NA)    135 mmol/L      134-147          

 

                POTASSIUM (test code=K)    4.3 mmol/L      3.4-5.0          

 

                CHLORIDE (test code=CL)    101 mmol/L      100-108          

 

                CARBON DIOXIDE (test code=CO2)    23 mmol/L       21-32            

 

                ANION GAP (test code=GAP)    11.0 GAP calc    4.0-15.0         

 

                GLUCOSE (test code=GLU)    181 MG/DL                  

 

                BLOOD UREA NITROGEN (test code=BUN)    51 MG/DL        7-18             

 

                GLOMERULAR FILTRATION RATE (test code=GFR)    13 estGFR       >60              

 

                CREATININE (test code=CREAT)    4.6 MG/DL       0.6-1.0          

 

                CALCIUM (test code=CA)    8.0 MG/DL       8.5-10.1         





CBC W/AUTO [id number] 05:38:00* 





                Test Item       Value           Reference Range    Comments

 

                WHITE BLOOD CELL (test code=WBC)    17.6 K/mm3      3.5-11.0         

 

                RED BLOOD CELL (test code=RBC)    3.87 M/mm3      4.70-6.10        

 

                HEMOGLOBIN (test code=HGB)    10.5 G/DL       10.4-14.9        

 

                HEMATOCRIT (test code=HCT)    29.3 %          31.5-44.1        

 

                MEAN CELL VOLUME (test code=MCV)    75.7 Fl         84.5-98.6        

 

                MEAN CELL HGB (test code=MCH)    27.1 pg         27.0-34.2        

 

                MEAN CELL HGB CONCETRATION (test code=MCHC)    35.8 G/DL       31.5-34.0        

 

                RED CELL DISTRIBUTION WIDTH (test code=RDW)    18.6 SD         11.5-14.5        

 

                PLATELET COUNT (test code=PLT)    179.0 K/mm3     150-450          

 

                NEUTROPHIL % (test code=NT%)     %              40-76            

 

                LYMPHOCYTE % (test code=LY%)     %              20.5-51.1        

 

                MONOCYTE % (test code=MO%)     %              1.7-9.3          

 

                EOSINOPHIL % (test code=EO%)     %              0.0-6.0          

 

                BASOPHIL % (test code=BA%)     %              0.0-2.0          

 

                NEUTROPHIL # (test code=NT#)     K/mm3          1.8-7.6          

 

                LYMPHOCYTE # (test code=LY#)     K/mm3          0.6-3.2          

 

                MONOCYTE # (test code=MO#)     K/mm3          0.3-1.1          

 

                EOSINOPHIL # (test code=EO#)     K/mm3          0.0-0.4          

 

                BASOPHIL # (test code=BA#)     K/mm3          0.0-0.1          

 

                MANUAL DIFF REQUIRED (test code=MDIFF)     DIFF/SCN       CRITERIA         





ANTINUCLEAR ANTIBODIES [id number] 04:07:00* 





                Test Item       Value           Reference Range    Comments

 

                MARGARTE TITER (test code=ANATITR)                                     





AG HEPATITIS B [id number] 04:07:00* 





                Test Item       Value           Reference Range    Comments

 

                AG HEPATITIS B SURFACE (test code=HBSAG)     SCREEN         NEGATIVE         





AB HEPATITIS  04:07:00* 





                Test Item       Value           Reference Range    Comments

 

                AB HEPATITIS C (test code=HCVAB)    0.1             0.0-0.9         INFCE Result Units: s/co ratio  

                                Negative:     < 0.8                             
Indeterminate: 0.8 - 0.9                                  Positive:     > 0.9 
The CDC recommends that a positive HCV antibody result be followed up with a HCV
Nucleic Acid Amplification test (780114).





NEUTROPHIL CYTOPLASMIC [id number] 04:07:00* 





                Test Item       Value           Reference Range    Comments

 

                AB ANTI-NEUTROPHIL CYTO C (test code=NEUTCABC)                                     

 

                AB ANTI-NEUTROPHIL CYTO P (test code=NEUTCAB-P)                                     

 

                ATYPICAL ANCA (test code=ANCACOM)                                     





AB DNA DOUBLE [id number] 04:07:00* 





                Test Item       Value           Reference Range    Comments

 

                AB DNA DOUBLE STRAND (test code=DNADSAB)                                     





COMPLEMENT [id number] 04:07:00* 





                Test Item       Value           Reference Range    Comments

 

                COMPLEMENT C3 (test code=COMC3)                                     





COMPLEMENT [id number] 04:07:00* 





                Test Item       Value           Reference Range    Comments

 

                COMPLEMENT C4 (test code=COMC4)                                     





ANTINUCLEAR ANTIBODIES [id number] 04:07:00* 





                Test Item       Value           Reference Range    Comments

 

                MARGARET TITER (test code=ANATITR)                                     





AG HEPATITIS B [id number] 04:07:00* 





                Test Item       Value           Reference Range    Comments

 

                AG HEPATITIS B SURFACE (test code=HBSAG)    Negative        Negative        Performed At:  [facility] [id number] Kyle L MD Ph:[phone]





AB HEPATITIS  04:07:00* 





                Test Item       Value           Reference Range    Comments

 

                AB HEPATITIS C (test code=HCVAB)    0.1             0.0-0.9         INFCE Result Units: s/co ratio  

                                Negative:     < 0.8                             
Indeterminate: 0.8 - 0.9                                  Positive:     > 0.9 
The CDC recommends that a positive HCV antibody result be followed up with a HCV
Nucleic Acid Amplification test (142740).





NEUTROPHIL CYTOPLASMIC [id number] 04:07:00* 





                Test Item       Value           Reference Range    Comments

 

                AB ANTI-NEUTROPHIL CYTO C (test code=NEUTCABC)                                     

 

                AB ANTI-NEUTROPHIL CYTO P (test code=NEUTCAB-P)                                     

 

                ATYPICAL ANCA (test code=ANCACOM)                                     





AB DNA DOUBLE [id number] 04:07:00* 





                Test Item       Value           Reference Range    Comments

 

                AB DNA DOUBLE STRAND (test code=DNADSAB)                                     





COMPLEMENT [id number] 04:07:00* 





                Test Item       Value           Reference Range    Comments

 

                COMPLEMENT C3 (test code=COMC3)                                     





COMPLEMENT [id number] 04:07:00* 





                Test Item       Value           Reference Range    Comments

 

                COMPLEMENT C4 (test code=COMC4)                                     





UR SODIUM [id number] 20:27:00* 





                Test Item       Value           Reference Range    Comments

 

                UR SODIUM RANDOM (test code=JUNI)    27 MEQ/L                        The Reference Range and Method Performance

 specificationshave not been established for this fluid. The test resultshould 
be correlated into the clinical context forinterpretation.





UR POTASSIUM [id number] 20:27:00* 





                Test Item       Value           Reference Range    Comments

 

                UR POTASSIUM RANDOM (test code=KU)    67.8 MEQ/L                      The Reference Range and Method

 Performance specificationshave not been established for this fluid. The test 
resultshould be correlated into the clinical context forinterpretation.





UR SODIUM [id number] 20:27:00* 





                Test Item       Value           Reference Range    Comments

 

                UR SODIUM RANDOM (test code=JUNI)    27 MEQ/L        ()              The Reference Range and Method Performance

 specificationshave not been established for this fluid. The test resultshould 
be correlated into the clinical context forinterpretation.





UR POTASSIUM [id number] 20:27:00* 





                Test Item       Value           Reference Range    Comments

 

                UR POTASSIUM RANDOM (test code=KU)    67.8 MEQ/L      ()              The Reference Range and Method

 Performance specificationshave not been established for this fluid. The test 
resultshould be correlated into the clinical context forinterpretation.





UR CHLORIDE [id number] 20:27:00* 





                Test Item       Value           Reference Range    Comments

 

                UR CHLORIDE RANDOM (test code=CLU)     mmol/L         >10              





UR PROTEIN [id number] 20:27:00* 





                Test Item       Value           Reference Range    Comments

 

                UR PROTEIN TOTAL (test code=PROTU)    321.6 MG/DL     0.0-12.0         





UR CREATININE [id number] 20:27:00* 





                Test Item       Value           Reference Range    Comments

 

                UR CREATININE RANDOM (test code=CREATU)    195.0 MG/DL                





UR OSMOLALITY [id number] 20:27:00* 





                Test Item       Value           Reference Range    Comments

 

                UR OSMOLALITY RANDOM (test code=OSMOU)     mOsm/KG                         





- XR CHEST 1  12:17:00 Name: JOSHUA BORDEN                      Formerly [facility]                      : 1960 Age/S: 59  / F         [id number] 
[facility]              Unit #: [id number]     Loc:               [place] [id number]                Phys: Amador Zayas MD                                 
              Acct: [id number]  Dis Date:               Status: ADM IN        
                         PHONE #: [phone]     Exam Date: 2020  1125  
                  FAX #:                  Reason: VOMITING ON BIPAP             
                    EXAMS:                                               CPT:   
       [id number] XR CHEST 1 V                               [id number]             
Fluoro Time:            DAP (Gy m2):          Air Kerma (mGy):              Site
ID: [id number]               EXAMINATION:  - XR CHEST 1 V.               HISTORY: 
VOMITING ON BIPAP.               COMPARISON: 2020.               
Findings: There are mild bibasilar infiltrates improved on the left       and 
slightly worse on the right side.. The heart size is normal. There       is no 
effusion or pneumothorax.               The mediastinum and marta appear 
unremarkable.                 Impression: Mild bibasilar infiltrates.          
** Electronically Signed by Oliver Chapa MD on 2020 at 1217 **             
        Reported and signed by: Oliver Chapa MD                             CC: 
Amador Zayas MD; Danial Arboleda MD                                         
                                                        PAGE  1                 
     Signed Report                                 Name: JOSHUA BORDEN [place]                      : 1960 Age/S: 59  / F    
    [id number]              Unit #: [id number]     Loc:               
[place] [address]                Phys: Amador Zayas MD                    
                           Acct: [id number]  Dis Date:               Status: 
ADM IN                                  PHONE #: [phone]     Exam Date: 
2020  1122                     FAX #:                  Reason: VOMITING ON
BIPAP                                  EXAMS:                                   
           CPT:           [id number] XR CHEST 1 V                               
[id number]             Fluoro Time:            DAP (Gy m2):          Air Kerma (mGy):
        <Continued> Technologist: Emily Arias, RT(R)                          
        Trnscb Date/Time: 2020 (1217) [email or web address]                        
Orig Print D/T: S: 2020 (8354)                                            
PAGE  2                       Signed Report                               AB 
HEPATITIS B [id number] 07:10:00* 





                Test Item       Value           Reference Range    Comments

 

                AB HEPATITIS B SURFACE (test code=HBSAB)    <3.1 mIU/mL     Immunity>9.9      Status of

 Immunity                     Anti-HBs Level  ------------------                
    --------------Inconsistent with Immunity                   0.0 - 
9.9Consistent with Immunity                          >9.9Performed At:  
[facility] [address] [id number] Jaren CHANG MD 
Ph:[phone]





BASIC METABOLIC [id number] 06:26:00* 





                Test Item       Value           Reference Range    Comments

 

                SODIUM (test code=NA)    135 mmol/L      134-147          

 

                POTASSIUM (test code=K)    5.1 mmol/L      3.4-5.0          

 

                CHLORIDE (test code=CL)    104 mmol/L      100-108          

 

                CARBON DIOXIDE (test code=CO2)    21 mmol/L       21-32            

 

                ANION GAP (test code=GAP)    10.0 GAP calc    4.0-15.0         

 

                GLUCOSE (test code=GLU)    143 MG/DL                  

 

                BLOOD UREA NITROGEN (test code=BUN)    75 MG/DL        7-18             

 

                GLOMERULAR FILTRATION RATE (test code=GFR)    9 estGFR        >60              

 

                CREATININE (test code=CREAT)    6.1 MG/DL       0.6-1.0          

 

                CALCIUM (test code=CA)    8.3 MG/DL       8.5-10.1         





CBC W/AUTO [id number] 06:23:00* 





                Test Item       Value           Reference Range    Comments

 

                WHITE BLOOD CELL (test code=WBC)    15.8 K/mm3      3.5-11.0         

 

                RED BLOOD CELL (test code=RBC)    4.18 M/mm3      4.70-6.10        

 

                HEMOGLOBIN (test code=HGB)    11.4 G/DL       10.4-14.9        

 

                HEMATOCRIT (test code=HCT)    31.1 %          31.5-44.1        

 

                MEAN CELL VOLUME (test code=MCV)    74.4 Fl         84.5-98.6        

 

                MEAN CELL HGB (test code=MCH)    27.3 pg         27.0-34.2        

 

                MEAN CELL HGB CONCETRATION (test code=MCHC)    36.7 G/DL       31.5-34.0        

 

                RED CELL DISTRIBUTION WIDTH (test code=RDW)    18.6 SD         11.5-14.5        

 

                PLATELET COUNT (test code=PLT)    148.0 K/mm3     150-450          

 

                NEUTROPHIL % (test code=NT%)    68.7 %          40-76            

 

                LYMPHOCYTE % (test code=LY%)    16.0 %          20.5-51.1        

 

                MONOCYTE % (test code=MO%)    13.4 %          1.7-9.3          

 

                EOSINOPHIL % (test code=EO%)    1.7 %           0.0-6.0          

 

                BASOPHIL % (test code=BA%)    0.2 %           0.0-2.0          

 

                NEUTROPHIL # (test code=NT#)    10.88 K/mm3     1.8-7.6          

 

                LYMPHOCYTE # (test code=LY#)    2.5 K/mm3       0.6-3.2          

 

                MONOCYTE # (test code=MO#)    2.1 K/mm3       0.3-1.1          

 

                EOSINOPHIL # (test code=EO#)    0.3 K/mm3       0.0-0.4          

 

                BASOPHIL # (test code=BA#)    0.0 K/mm3       0.0-0.1          

 

                MANUAL DIFF REQUIRED (test code=MDIFF)    NO DIFF/SCN     CRITERIA        SLIDE REVIEW CONSISTANT

 WITH AUTO DIFFERENTIAL.PLATELET COUNT REVIEWED AND VERIFIED.





CBC W/AUTO [id number] 06:12:00* 





                Test Item       Value           Reference Range    Comments

 

                WHITE BLOOD CELL (test code=WBC)    15.8 K/mm3      3.5-11.0         

 

                RED BLOOD CELL (test code=RBC)    4.18 M/mm3      4.70-6.10        

 

                HEMOGLOBIN (test code=HGB)    11.4 G/DL       10.4-14.9        

 

                HEMATOCRIT (test code=HCT)    31.1 %          31.5-44.1        

 

                MEAN CELL VOLUME (test code=MCV)    74.4 Fl         84.5-98.6        

 

                MEAN CELL HGB (test code=MCH)    27.3 pg         27.0-34.2        

 

                MEAN CELL HGB CONCETRATION (test code=MCHC)    36.7 G/DL       31.5-34.0        

 

                RED CELL DISTRIBUTION WIDTH (test code=RDW)    18.6 SD         11.5-14.5        

 

                PLATELET COUNT (test code=PLT)    148.0 K/mm3     150-450          

 

                NEUTROPHIL % (test code=NT%)     %              40-76            

 

                LYMPHOCYTE % (test code=LY%)     %              20.5-51.1        

 

                MONOCYTE % (test code=MO%)     %              1.7-9.3          

 

                EOSINOPHIL % (test code=EO%)     %              0.0-6.0          

 

                BASOPHIL % (test code=BA%)     %              0.0-2.0          

 

                NEUTROPHIL # (test code=NT#)     K/mm3          1.8-7.6          

 

                LYMPHOCYTE # (test code=LY#)     K/mm3          0.6-3.2          

 

                MONOCYTE # (test code=MO#)     K/mm3          0.3-1.1          

 

                EOSINOPHIL # (test code=EO#)     K/mm3          0.0-0.4          

 

                BASOPHIL # (test code=BA#)     K/mm3          0.0-0.1          

 

                MANUAL DIFF REQUIRED (test code=MDIFF)     DIFF/SCN       CRITERIA         





PARATHYROID HORMONE [id number] 19:20:00* 





                Test Item       Value           Reference Range    Comments

 

                PARATHYROID HORMONE INTACT (test code=PARAI)    458 PG/ML       26-72            





UR SODIUM [id number] 18:26:00* 





                Test Item       Value           Reference Range    Comments

 

                UR SODIUM RANDOM (test code=JUNI)     MEQ/L                           





UR POTASSIUM [id number] 18:26:00* 





                Test Item       Value           Reference Range    Comments

 

                UR POTASSIUM RANDOM (test code=KU)     mmol/L         >0               





UR CHLORIDE [id number] 18:26:00* 





                Test Item       Value           Reference Range    Comments

 

                UR CHLORIDE RANDOM (test code=CLU)     mmol/L         >10              





UR PROTEIN [id number] 18:26:00* 





                Test Item       Value           Reference Range    Comments

 

                UR PROTEIN TOTAL (test code=PROTU)    321.6 MG/DL     0.0-12.0         





UR CREATININE [id number] 18:26:00* 





                Test Item       Value           Reference Range    Comments

 

                UR CREATININE RANDOM (test code=CREATU)    195.0 MG/DL                





UR OSMOLALITY [id number] 18:26:00* 





                Test Item       Value           Reference Range    Comments

 

                UR OSMOLALITY RANDOM (test code=OSMOU)     mOsm/KG                         





- CT HEAD/BRAIN W/O [id number] 18:16:00  Name: JOSHUA BORDEN                
    Formerly [facility]                      : 1960 Age/S: 59  / F         
[id number]              Unit #: [id number]     Loc:               
[place] [address]                Phys: Danial Arboleda MD                     
                           Acct: [id number]  Dis Date:               Status: 
ADM IN                                  PHONE #: [phone]     Exam Date: 
2020  0141                     FAX #:                   Reason: AMS       
                                         EXAMS:                                 
             CPT:           [id number] CT HEAD/BRAIN W/O CONT                    
[id number]                    Examination:  Head CT without contrast               
Location code: H60               Comparison: None               Technique:      
Axial contiguous images through the brain were obtained without       contrast 
media. All CT scans are performed using radiation dose       reduction 
technique.  Technical factors are evaluated and adjusted to       insure 
appropriate moderation of exposure.  Automated dose management       technology 
is applied to adjust the radiation dose to minimize       exposure while 
achieving a diagnostic quality image.                               Discussion: 
             Clinical history is remarkable for change in mental status.  There 
is       no evidence of hydrocephalus, midline shift, extra-axial fluid       
collection, hemorrhage, acute infarction, or space-occupying mass       lesion. 
The gray-white matter differentiation is preserved.  Chronic       periventr
icular small vessel ischemic changes identified.  Old small       lacunar infarc
t is identified in the left basal ganglia.               The calvarium and the p
aranasal sinuses are within normal limits.                 Impression:          
        1.  Chronic periventricular small vessel ischemic changes.         2.  
Small lacunar infarct involving the left basal ganglia.          ** Electronical
ly Signed by CAITLIN Cunha on 2020 at 1816 **                      Repor
keiko and signed by: Fazal Cunha M.D.         CC: Danial Arboleda MD              
                                                                                
                      Technologist:Alvaro Gannon, RT(R)(CT); Jordan  CTDI:        
DLP:        Trnscb Date/Time: 2020 () [id number]                       
Orig Print D/T: S: 2020 ()     PAGE  1                       Signed 
Report                               LACTIC [id number] 17:57:00* 





                Test Item       Value           Reference Range    Comments

 

                LACTIC ACID (test code=LACT)    1.8 mmol/L      0.4-2.0          





[id number] 17:57:00* 





                Test Item       Value           Reference Range    Comments

 

                PHOSPHOROUS (test code=PHOS)    5.8 MG/DL       2.5-4.9          





- US RETROPERITONEAL [id number] 16:56:00  Name: JOSHUA BORDEN [place]                      : 1960 Age/S: 59  / F         
[address]              Unit #: [id number]     Loc:               
[place] [address]                Phys: Josafat Croft MD              
                           Acct: [id number]  Dis Date:               Status: 
ADM IN                                  PHONE #: [phone]     Exam Date: 
2020  1631                     FAX #:                   Reason: Jenn on CKD
                                         EXAMS:                                 
             CPT:           [id number] US RETROPERITONEAL COM                    
[id number]                    Site ID: [id number]               EXAMINATION:  - US 
RETROPERITONEAL COM.               HISTORY:  Jenn on CKD               
COMPARISON:  None.               TECHNIQUE: Routine renal ultrasound was 
performed.               FINDINGS:       The right kidney measures 8 cm, and the
left kidney measures 8.9 cm in       length.  Cortical echogenicity and thi
ckness are within normal limits.        No hydronephrosis. Simple cysts are seen
up to 2.2 cm in the lower       pole of the right kidney and 0.9 cm in the left 
kidney.               The urinary bladder is decompressed with a Hagan's cathet
er.                         IMPRESSION:         No hydronephrosis.          ** E
lectronically Signed by Oliver Chapa MD on 2020 at 1656 **                 
    Reported and signed by: Oliver Chapa MD                    CC: Josafat Ponce MD; Danial Arboleda MD                                               
                                           Technologist: Litzy Ramos           
                               Trnscb Date/Time: 2020 (1656) Charmaine    
                    PAGE  1                       Signed Report                 
                Name: JOSHUA BORDEN [place]           
          : 1960 Age/S: 59  / F         [id number]             
Unit #: [id number]     Loc:               [place] [address]                
Phys: Josafat Croft MD                                          Acct: 
[id number]  Dis Date:               Status: ADM IN                            
     PHONE #: [phone]     Exam Date: 2020  1631                     
FAX #:                   Reason: Jenn on CKD                                     
    EXAMS:                                               CPT:           [id number]
[id number] US RETROPERITONEAL [facility]                     [id number]               <Continued> 
Orig Print D/T: S: 2020 (7860)     Probe:                                 
                              PAGE  2                       Signed Report       
                       BASIC METABOLIC [id number] 15:45:00* 





                Test Item       Value           Reference Range    Comments

 

                SODIUM (test code=NA)    136 mmol/L      134-147          

 

                POTASSIUM (test code=K)    4.6 mmol/L      3.4-5.0          

 

                CHLORIDE (test code=CL)    105 mmol/L      100-108          

 

                CARBON DIOXIDE (test code=CO2)    21 mmol/L       21-32            

 

                ANION GAP (test code=GAP)    10.0 GAP calc    4.0-15.0         

 

                GLUCOSE (test code=GLU)    155 MG/DL                  

 

                BLOOD UREA NITROGEN (test code=BUN)    62 MG/DL        7-18             

 

                GLOMERULAR FILTRATION RATE (test code=GFR)    12 estGFR       >60              

 

                CREATININE (test code=CREAT)    4.8 MG/DL       0.6-1.0          

 

                CALCIUM (test code=CA)    8.0 MG/DL       8.5-10.1         





RESPIRATORY VIRUS PANEL [id number] 11:14:00* 





                Test Item       Value           Reference Range    Comments

 

                RSV A PCR (test code=RSV A)    Negative        Negative         

 

                RSV B PCR (test code=RSV B)    Negative        Negative         

 

                INFLUENZA A (test code=FLUAPCR)    Negative        Negative         

 

                INFLUENZA A SUBTYPE H1 (test code=FLUAH1)    Negative        Negative         

 

                INFLUENZA A SUBTYPE H3 (test code=FLUAH3)    Negative        Negative         

 

                INFLUENZA B (test code=FLUBPCR)    Negative        Negative         

 

                PARAINFLUENZA TYPE 1 PCR (test code=PIF1)    Negative        Negative         

 

                PARAINFLUENZA TYPE 2 PCR (test code=PIF2)    Negative        Negative         

 

                PARAINFLUENZA TYPE 3 PCR (test code=PIF3)    Negative        Negative         

 

                PARAINFLUENZA TYPE 4 PCR (test code=PIF4)    Negative        Negative         

 

                RHINOVIRUS PCR (test code=RHINO)    Negative        Negative         

 

                METAPNEUMOVIRUS PCR (test code=METAPNEU)    Negative        Negative         

 

                ADENOVIRUS PCR (test code=ADENOPCR)    Negative        Negative         

 

                BORDETELLA PERTUSSIS DNA PCR (test code=BORDPERDNA)    Negative        Negative         

 

                B PARAPERTUSSIS BY PCR (test code=BPARAPCR)    Negative        Negative         

 

                BORDETELLA HOLMESII (test code=BORDHOLM)    Negative        Negative        Testing was performed

 using nucleic acid amplificationincluding Bordetella 
parapertussis/brochiseptica, Bordetella holmesii, and Bordetella pertussis.

 

                RVP RESULT COMMENT (test code=RVPCOMM)    RVP Comment     Comment         Testing was performed

 using nucleic acid amplificationincluding influenza A, influenza A H1, 
influenza A H3,influenza B, RSV-A, RSV-B, Adenovirus, HumanMetapneumovirus, 
Parainfluenza 1,2,3 and 4, Rhinovirus, Bordetella parapertussis/brochiseptica, 
Bordetella holmesii, and Bordetella pertussis.





RESPIRATORY VIRUS PANEL [id number] 11:14:00* 





                Test Item       Value           Reference Range    Comments

 

                RSV A PCR (test code=RSV A)    Negative        Negative         

 

                RSV B PCR (test code=RSV B)    Negative        Negative         

 

                INFLUENZA A PCR (test code=FLUAPCR)    Negative        Negative         

 

                INFLUENZA B PCR (test code=FLUBPCR)    Negative        Negative         

 

                PARAINFLUENZA TYPE 1 PCR (test code=PIF1)    Negative        Negative         

 

                PARAINFLUENZA TYPE 2 PCR (test code=PIF2)    Negative        Negative         

 

                PARAINFLUENZA TYPE 3 PCR (test code=PIF3)    Negative        Negative         

 

                PARAINFLUENZA TYPE 4 PCR (test code=PIF4)    Negative        Negative         

 

                RHINOVIRUS PCR (test code=RHINO)    Negative        Negative         

 

                METAPNEUMOVIRUS PCR (test code=METAPNEU)    Negative        Negative         

 

                ADENOVIRUS PCR (test code=ADENOPCR)    Negative        Negative         

 

                BORDETELLA PERTUSSIS DNA PCR (test code=BORDPERDNA)    Negative        Negative         

 

                B PARAPERTUSSIS BY PCR (test code=BPARAPCR)    Negative        Negative         





AG HEPATITIS B [id number] 06:08:00* 





                Test Item       Value           Reference Range    Comments

 

                AG HEPATITIS B SURFACE (test code=HBSAG)    Negative        Negative         





AB HEPATITIS B [id number] 06:08:00* 





                Test Item       Value           Reference Range    Comments

 

                AB HEPATITIS B CORE (test code=HBCAB)                                     





AG HEPATITIS B [id number] 06:08:00* 





                Test Item       Value           Reference Range    Comments

 

                AG HEPATITIS B SURFACE (test code=HBSAG)    Negative        Negative         





AB HEPATITIS B [id number] 06:08:00* 





                Test Item       Value           Reference Range    Comments

 

                AB HEPATITIS B CORE (test code=HBCAB)    Negative        Negative        Performed At:  [facility]

 [address] [id number] Kyle L MD Ph:[phone]





CBC W/AUTO [id number] 06:06:00* 





                Test Item       Value           Reference Range    Comments

 

                WHITE BLOOD CELL (test code=WBC)    13.3 K/mm3      3.5-11.0         

 

                RED BLOOD CELL (test code=RBC)    4.05 M/mm3      4.70-6.10        

 

                HEMOGLOBIN (test code=HGB)    10.9 G/DL       10.4-14.9        

 

                HEMATOCRIT (test code=HCT)    29.6 %          31.5-44.1        

 

                MEAN CELL VOLUME (test code=MCV)    73.1 Fl         84.5-98.6        

 

                MEAN CELL HGB (test code=MCH)    26.9 pg         27.0-34.2        

 

                MEAN CELL HGB CONCETRATION (test code=MCHC)    36.8 G/DL       31.5-34.0        

 

                RED CELL DISTRIBUTION WIDTH (test code=RDW)    18.1 SD         11.5-14.5        

 

                PLATELET COUNT (test code=PLT)    124.0 K/mm3     150-450          

 

                NEUTROPHIL % (test code=NT%)    65.9 %          40-76            

 

                LYMPHOCYTE % (test code=LY%)    14.0 %          20.5-51.1        

 

                MONOCYTE % (test code=MO%)    17.3 %          1.7-9.3          

 

                EOSINOPHIL % (test code=EO%)    2.6 %           0.0-6.0          

 

                BASOPHIL % (test code=BA%)    0.2 %           0.0-2.0          

 

                NEUTROPHIL # (test code=NT#)    8.77 K/mm3      1.8-7.6          

 

                LYMPHOCYTE # (test code=LY#)    1.9 K/mm3       0.6-3.2          

 

                MONOCYTE # (test code=MO#)    2.3 K/mm3       0.3-1.1          

 

                EOSINOPHIL # (test code=EO#)    0.3 K/mm3       0.0-0.4          

 

                BASOPHIL # (test code=BA#)    0.0 K/mm3       0.0-0.1          

 

                MANUAL DIFF REQUIRED (test code=MDIFF)    NO DIFF/SCN     CRITERIA        SLIDE REVIEW CONSISTANT

 WITH AUTO DIFFERENTIAL.PLATELET COUNT REVIEWED AND VERIFIED.





BASIC METABOLIC [id number] 05:47:00* 





                Test Item       Value           Reference Range    Comments

 

                SODIUM (test code=NA)    135 mmol/L      134-147          

 

                POTASSIUM (test code=K)    5.6 mmol/L      3.4-5.0          

 

                CHLORIDE (test code=CL)    106 mmol/L      100-108          

 

                CARBON DIOXIDE (test code=CO2)    18 mmol/L       21-32            

 

                ANION GAP (test code=GAP)    11.0 GAP calc    4.0-15.0         

 

                GLUCOSE (test code=GLU)    173 MG/DL                  

 

                BLOOD UREA NITROGEN (test code=BUN)    86 MG/DL        7-18             

 

                GLOMERULAR FILTRATION RATE (test code=GFR)    9 estGFR        >60              

 

                CREATININE (test code=CREAT)    6.3 MG/DL       0.6-1.0          

 

                CALCIUM (test code=CA)    7.9 MG/DL       8.5-10.1         





[id number] 05:47:00* 





                Test Item       Value           Reference Range    Comments

 

                PHOSPHOROUS (test code=PHOS)    6.5 MG/DL       2.5-4.9          





[id number] 05:47:00* 





                Test Item       Value           Reference Range    Comments

 

                MAGNESIUM (test code=MAG)    2.4 MG/DL       1.8-2.4          





CBC W/AUTO [id number] 05:37:00* 





                Test Item       Value           Reference Range    Comments

 

                WHITE BLOOD CELL (test code=WBC)    13.3 K/mm3      3.5-11.0         

 

                RED BLOOD CELL (test code=RBC)    4.05 M/mm3      4.70-6.10        

 

                HEMOGLOBIN (test code=HGB)    10.9 G/DL       10.4-14.9        

 

                HEMATOCRIT (test code=HCT)    29.6 %          31.5-44.1        

 

                MEAN CELL VOLUME (test code=MCV)    73.1 Fl         84.5-98.6        

 

                MEAN CELL HGB (test code=MCH)    26.9 pg         27.0-34.2        

 

                MEAN CELL HGB CONCETRATION (test code=MCHC)    36.8 G/DL       31.5-34.0        

 

                RED CELL DISTRIBUTION WIDTH (test code=RDW)    18.1 SD         11.5-14.5        

 

                PLATELET COUNT (test code=PLT)    124.0 K/mm3     150-450          

 

                NEUTROPHIL % (test code=NT%)     %              40-76            

 

                LYMPHOCYTE % (test code=LY%)     %              20.5-51.1        

 

                MONOCYTE % (test code=MO%)     %              1.7-9.3          

 

                EOSINOPHIL % (test code=EO%)     %              0.0-6.0          

 

                BASOPHIL % (test code=BA%)     %              0.0-2.0          

 

                NEUTROPHIL # (test code=NT#)     K/mm3          1.8-7.6          

 

                LYMPHOCYTE # (test code=LY#)     K/mm3          0.6-3.2          

 

                MONOCYTE # (test code=MO#)     K/mm3          0.3-1.1          

 

                EOSINOPHIL # (test code=EO#)     K/mm3          0.0-0.4          

 

                BASOPHIL # (test code=BA#)     K/mm3          0.0-0.1          

 

                MANUAL DIFF REQUIRED (test code=MDIFF)     DIFF/SCN       CRITERIA         





BASIC METABOLIC [id number] 10:36:00* 





                Test Item       Value           Reference Range    Comments

 

                SODIUM (test code=NA)    137 mmol/L      134-147          

 

                POTASSIUM (test code=K)    4.8 mmol/L      3.4-5.0          

 

                CHLORIDE (test code=CL)    109 mmol/L      100-108          

 

                CARBON DIOXIDE (test code=CO2)    20 mmol/L       21-32            

 

                ANION GAP (test code=GAP)    8.0 GAP calc    4.0-15.0         

 

                GLUCOSE (test code=GLU)    141 MG/DL                  

 

                BLOOD UREA NITROGEN (test code=BUN)    69 MG/DL        7-18             

 

                GLOMERULAR FILTRATION RATE (test code=GFR)    11 estGFR       >60              

 

                CREATININE (test code=CREAT)    5.0 MG/DL       0.6-1.0          

 

                CALCIUM (test code=CA)    7.5 MG/DL       8.5-10.1         





[id number] 07:26:00* 





                Test Item       Value           Reference Range    Comments

 

                TROPONIN-I (test code=TROPI)    0.770 NG/ML     0.000-0.045     Negative: </=0.045 Positive:

 >/=0.046 Correlation with serial results, other cardiac markers, and clinical 
findings is necessary to determine the clinical  significance of this result. 
Quantitative results using different methodologies should not be compared to one
another as numerical results may varyby method.





Completed by Nursing: [id number] 04:13:00* 





                Test Item       Value           Reference Range    Comments

 

                TROPONIN-I (test code=TROPI)    0.739 NG/ML     0.000-0.045     Negative: </=0.045 Positive:

 >/=0.046 Correlation with serial results, other cardiac markers, and clinical 
findings is necessary to determine the clinical  significance of this result. 
Quantitative results using different methodologies should not be compared to one
another as numerical results may varyby method.





Completed by Nursing: NOBASIC METABOLIC [id number] 04:06:00* 





                Test Item       Value           Reference Range    Comments

 

                SODIUM (test code=NA)    133 mmol/L      134-147          

 

                POTASSIUM (test code=K)    8.1 mmol/L      3.4-5.0          

 

                CHLORIDE (test code=CL)    113 mmol/L      100-108          

 

                CARBON DIOXIDE (test code=CO2)    9 mmol/L        21-32            

 

                ANION GAP (test code=GAP)    11.0 GAP calc    4.0-15.0         

 

                GLUCOSE (test code=GLU)    227 MG/DL                  

 

                BLOOD UREA NITROGEN (test code=BUN)    134 MG/DL       7-18             

 

                GLOMERULAR FILTRATION RATE (test code=GFR)    6 estGFR        >60              

 

                CREATININE (test code=CREAT)    8.3 MG/DL       0.6-1.0          

 

                CALCIUM (test code=CA)    7.5 MG/DL       8.5-10.1         





UA RFLX MICR CULT IF [id number] 02:33:00* 





                Test Item       Value           Reference Range    Comments

 

                UA COLOR (test code=COLU)    YELLOW discript    YEL/STRAW        

 

                UA APPEARANCE (test code=APPU)    TURBID discript    CLEAR            

 

                UA GLUCOSE DIPSTICK (test code=DGLUU)    NEGATIVE mg/dL    NEG              

 

                UA BILIRUBIN DIPSTICK (test code=BILU)    NEGATIVE mg/dL    NEG              

 

                UA KETONE DIPSTICK (test code=KETU)    NEGATIVE mg/dL    NEG              

 

                UA SPECIFIC GRAVITY (test code=SGU)    1.020 SG        1.005-1.030      

 

                UA BLOOD DIPSTICK (test code=SHERRIE)    3+ mg/DL        NEG              

 

                UA PH DIPSTICK (test code=KINJAL)    <=5.0 pH UNITS    5.0-7.0          

 

                UA PROTEIN DIPSTICK (test code=PROU)    2+ mg/dL        NEG              

 

                UA UROBILINIOGEN DIPSTICK (test code=URO)    0.2 mg/dL       <2.0             

 

                UA NITRITE DIPSTICK (test code=JOSE JUAN)    NEGATIVE SCREEN    NEG              

 

                UA LEUKOCYTE ESTERASE DIPSTICK (test code=LEUU)    NEGATIVE Leuk/mcL    NEGATIVE         

 

                UA CULTURE NEEDED? (test code=UACULT)     Criteria       Culture CHK      





SOURCE OF URINE: CLEAN CATCHIndication for culture:     [facility]-if no other src
UA RFLX MICR CULT IF [id number] 02:33:00* 





                Test Item       Value           Reference Range    Comments

 

                UA COLOR (test code=COLU)    YELLOW discript    YEL/STRAW        

 

                UA APPEARANCE (test code=APPU)    TURBID discript    CLEAR            

 

                UA GLUCOSE DIPSTICK (test code=DGLUU)    NEGATIVE mg/dL    NEG              

 

                UA BILIRUBIN DIPSTICK (test code=BILU)    NEGATIVE mg/dL    NEG              

 

                UA KETONE DIPSTICK (test code=KETU)    NEGATIVE mg/dL    NEG              

 

                UA SPECIFIC GRAVITY (test code=SGU)    1.020 SG        1.005-1.030      

 

                UA BLOOD DIPSTICK (test code=SHERRIE)    3+ mg/DL        NEG              

 

                UA PH DIPSTICK (test code=KINJAL)    <=5.0 pH UNITS    5.0-7.0          

 

                UA PROTEIN DIPSTICK (test code=PROU)    2+ mg/dL        NEG              

 

                UA UROBILINIOGEN DIPSTICK (test code=URO)    0.2 mg/dL       <2.0             

 

                UA NITRITE DIPSTICK (test code=JOSE JUAN)    NEGATIVE SCREEN    NEG              

 

                UA LEUKOCYTE ESTERASE DIPSTICK (test code=LEUU)    NEGATIVE Leuk/mcL    NEGATIVE         

 

                UA WBC (test code=WBCU)    1-3 #WBC/HPF    0-3              

 

                UA RBC (test code=RBCU)    0-1 #RBC/HPF    0-3              

 

                UA BACTERIA (test code=BACU)    4+ /HPF         NONE-TRACE       

 

                UA SQUAMOUS CELLS (test code=SQU)    TRACE /HPF      NONE             

 

                UA CULTURE NEEDED? (test code=UACULT)    NO, WBC<10 Criteria    Culture CHK      





SOURCE OF URINE: CLEAN CATCHIndication for culture:     [facility]-if no other src
CBC W/AUTO [id number] 02:01:00* 





                Test Item       Value           Reference Range    Comments

 

                WHITE BLOOD CELL (test code=WBC)    16.1 K/mm3      3.5-11.0         

 

                RED BLOOD CELL (test code=RBC)    4.47 M/mm3      4.70-6.10        

 

                HEMOGLOBIN (test code=HGB)    12.1 G/DL       10.4-14.9        

 

                HEMATOCRIT (test code=HCT)    32.3 %          31.5-44.1        

 

                MEAN CELL VOLUME (test code=MCV)    72.3 Fl         84.5-98.6        

 

                MEAN CELL HGB (test code=MCH)    27.1 pg         27.0-34.2        

 

                MEAN CELL HGB CONCETRATION (test code=MCHC)    37.5 G/DL       31.5-34.0        

 

                RED CELL DISTRIBUTION WIDTH (test code=RDW)    17.8 SD         11.5-14.5        

 

                PLATELET COUNT (test code=PLT)    161.0 K/mm3     150-450          

 

                NEUTROPHIL % (test code=NT%)    76.4 %          40-76            

 

                LYMPHOCYTE % (test code=LY%)    10.8 %          20.5-51.1        

 

                MONOCYTE % (test code=MO%)    12.0 %          1.7-9.3          

 

                EOSINOPHIL % (test code=EO%)    0.7 %           0.0-6.0          

 

                BASOPHIL % (test code=BA%)    0.1 %           0.0-2.0          

 

                NEUTROPHIL # (test code=NT#)    12.30 K/mm3     1.8-7.6          

 

                LYMPHOCYTE # (test code=LY#)    1.7 K/mm3       0.6-3.2          

 

                MONOCYTE # (test code=MO#)    1.9 K/mm3       0.3-1.1          

 

                EOSINOPHIL # (test code=EO#)    0.1 K/mm3       0.0-0.4          

 

                BASOPHIL # (test code=BA#)    0.0 K/mm3       0.0-0.1          

 

                MANUAL DIFF REQUIRED (test code=MDIFF)    NO DIFF/SCN     CRITERIA        SLIDE REVIEW CONSISTANT

 WITH AUTO DIFFERENTIAL.





[id number] 02:00:00* 





                Test Item       Value           Reference Range    Comments

 

                TROPONIN-I (test code=TROPI)    0.766 NG/ML     0.000-0.045     Negative: </=0.045 Positive:

 >/=0.046 Correlation with serial results, other cardiac markers, and clinical 
findings is necessary to determine the clinical  significance of this result. 
Quantitative results using different methodologies should not be compared to one
another as numerical results may varyby method.





Completed by Nursing: NOCOMPREHENSIVE METABOLIC [id number] 01:30:00* 





                Test Item       Value           Reference Range    Comments

 

                SODIUM (test code=NA)    131 mmol/L      134-147          

 

                POTASSIUM (test code=K)    8.6 mmol/L      3.4-5.0          

 

                CHLORIDE (test code=CL)    110 mmol/L      100-108          

 

                CARBON DIOXIDE (test code=CO2)    9 mmol/L        21-32            

 

                ANION GAP (test code=GAP)    12.0 GAP calc    4.0-15.0         

 

                GLUCOSE (test code=GLU)    123 MG/DL                  

 

                BLOOD UREA NITROGEN (test code=BUN)    140 MG/DL       7-18             

 

                GLOMERULAR FILTRATION RATE (test code=GFR)    6 estGFR        >60              

 

                CREATININE (test code=CREAT)    8.9 MG/DL       0.6-1.0          

 

                TOTAL PROTEIN (test code=PROT)    7.6 G/DL        6.4-8.2          

 

                ALBUMIN (test code=ALB)    2.6 G/DL        3.4-5.0          

 

                GLOBULIN (test code=GLOB)    5.0 GM/dL                        

 

                ALBUMIN/GLOBULIN RATIO (test code=A/G)    0.5 RATIO       1.2-2.2          

 

                CALCIUM (test code=CA)    8.3 MG/DL       8.5-10.1         

 

                BILIRUBIN TOTAL (test code=BILT)    1.90 MG/DL      0.2-1.2          

 

                SGOT/AST (test code=AST)    1006 Unit/L     15-37            

 

                SGPT/ALT (test code=ALT)    270 Unit/L      12-78            

 

                ALKALINE PHOSPHATASE TOTAL (test code=ALKP)    357 Unit/L                 





[id number] 01:30:00* 





                Test Item       Value           Reference Range    Comments

 

                MAGNESIUM (test code=MAG)    3.1 MG/DL       1.8-2.4          





NT PRO-BRAIN NATRIURETIC [id number] 01:30:00* 





                Test Item       Value           Reference Range    Comments

 

                NT PRO-BRAIN NATRIURETIC PEPTI (test code=PROBNP)    909 PG/ML       0-100            





LACTIC [id number] 01:27:00* 





                Test Item       Value           Reference Range    Comments

 

                LACTIC ACID (test code=LACT)    1.5 mmol/L      0.4-2.0          





- XR CHEST 1 -42-30 01:12:00 Name: JOSHUA BORDEN                      Formerly [facility]                      : 1960 Age/S: 59  / F         [id number] 
[address]              Unit #: [id number]     Loc:               [place]
[address]                Phys: Lyric Olson MD                              
              Acct: [id number]  Dis Date:               Status: REG         
                         PHONE #: [phone]     Exam Date: 2020  0108  
                  FAX #:                  Reason: Suspected Sepsis              
                    EXAMS:                                               CPT:   
       [id number] XR CHEST 1 V                               [id number]             
Fluoro Time:            DAP (Gy m2):          Air Kerma (mGy):              
EXAMINATION:   - XR CHEST 1 V               LOCATION: 1               
INDICATION/CLINICAL HISTORY: Suspected Sepsis                COMPARISON:  Chest 
x-ray 2017.               TECHNIQUE: Frontal view of the chest.           
   FINDINGS:                 Cardiomediastinal silhouette: Cardiac silhouette is
normal in size.        There is mild aortic arch atherosclerotic calcification. 
             Pulmonary vasculature: Not congested.               Lungs/pleura: 
New patchy left basilar consolidation.  Linear densities       in the right lung
base may reflect atelectasis or infiltrates. No       pneumothorax or pleural 
effusion.               Upper abdomen: Cholecystectomy clips in the right upper 
quadrant.  No       subdiaphragmatic free air.               Regional osseous 
structures: Intact.                 IMPRESSION:           Left basilar 
consolidation which may reflect pneumonia in the         appropriate clinical 
setting.          ** Electronically Signed by CAITLIN Royal on 2020 at 
0112 **                      Reported and signed by: Luz Royal M.D.          
    CC: Lyric Olson MD                                                  
                                                                PAGE  1         
             Signed Report                                 Name: JOSHUA BORDEN [place]                      : 1960 Age/S: 59 
/ F         [id number] [address]              Unit #: [id number]     Loc:         
     [place] [address]                Phys: Lyric Olson MD            
                                Acct: [id number]  Dis Date:               
Status: REG ER                                  PHONE #: [phone]     Exam 
Date: 2020  0108                     FAX #:                  Reason: 
Suspected Sepsis                                   EXAMS:                       
                       CPT:           [id number] XR CHEST 1 V                    
          [id number]             Fluoro Time:            DAP (Gy m2):          Air 
Kerma (mGy):         <Continued> Technologist: Terrance Vásquez, RT(R)(CT)              
                     Trnscb Date/Time: 2020 (011) t.SDR.[id number]             
         Orig Print D/T: S: 2020 (0115)                                   
         PAGE  2                       Signed Report                            
  CBC W/AUTO [id number] 01:07:00* 





                Test Item       Value           Reference Range    Comments

 

                WHITE BLOOD CELL (test code=WBC)    16.1 K/mm3      3.5-11.0         

 

                RED BLOOD CELL (test code=RBC)    4.47 M/mm3      4.70-6.10        

 

                HEMOGLOBIN (test code=HGB)    12.1 G/DL       10.4-14.9        

 

                HEMATOCRIT (test code=HCT)    32.3 %          31.5-44.1        

 

                MEAN CELL VOLUME (test code=MCV)    72.3 Fl         84.5-98.6        

 

                MEAN CELL HGB (test code=MCH)    27.1 pg         27.0-34.2        

 

                MEAN CELL HGB CONCETRATION (test code=MCHC)    37.5 G/DL       31.5-34.0        

 

                RED CELL DISTRIBUTION WIDTH (test code=RDW)    17.8 SD         11.5-14.5        

 

                PLATELET COUNT (test code=PLT)    161.0 K/mm3     150-450          

 

                NEUTROPHIL % (test code=NT%)     %              40-76            

 

                LYMPHOCYTE % (test code=LY%)     %              20.5-51.1        

 

                MONOCYTE % (test code=MO%)     %              1.7-9.3          

 

                EOSINOPHIL % (test code=EO%)     %              0.0-6.0          

 

                BASOPHIL % (test code=BA%)     %              0.0-2.0          

 

                NEUTROPHIL # (test code=NT#)     K/mm3          1.8-7.6          

 

                LYMPHOCYTE # (test code=LY#)     K/mm3          0.6-3.2          

 

                MONOCYTE # (test code=MO#)     K/mm3          0.3-1.1          

 

                EOSINOPHIL # (test code=EO#)     K/mm3          0.0-0.4          

 

                BASOPHIL # (test code=BA#)     K/mm3          0.0-0.1          

 

                MANUAL DIFF REQUIRED (test code=MDIFF)     DIFF/SCN       CRITERIA         





PROTEIN ELECTROPHORESIS [id number] 15:14:00* 





                Test Item       Value           Reference Range    Comments

 

                TOTAL PROTEIN (test code=PROTE)    5.8 g/dL        6.0-8.5          

 

                ALBUMIN (test code=ALBE)    2.9 g/dL        2.9-4.4          

 

                ALPHA-1-GLOBULIN (test code=A1G)    0.2 g/dL        0.0-0.4          

 

                ALPHA-2-GLOBULIN (test code=A2G)    0.6 g/dL        0.4-1.0          

 

                BETA GLOBULIN (test code=BG)    0.8 g/dL        0.7-1.3          

 

                GAMMA GLOBULIN (test code=GG)    1.3 g/dL        0.4-1.8          

 

                M-SPIKE,SERUM (test code=MSPIKES)    Not Observed g/dL    Not Observed     

 

                GLOBULIN ELECT (test code=GLOBE)    2.9 g/dL        2.2-3.9          

 

                ALBUMIN/GLOBULIN RATIO (test code=AGE)    1.0             0.7-1.7          

 

                PROT.ELECTROPH.INTERPRETATION (test code=ELEINT)    Comment         ()              Protein electrophoresis

 scan will follow via computer,mail, or  delivery.Performed At:  
[facility] [address] [id number] Kyle L MD 
Ph:[phone] At: [facility] [id number] [facility] [address] [place], [place] 
[id number] CN MD Ph:[phone]





BASIC METABOLIC [id number] 05:27:00* 





                Test Item       Value           Reference Range    Comments

 

                SODIUM (test code=NA)    145 mmol/L      134-147          

 

                POTASSIUM (test code=K)    4.3 mmol/L      3.4-5.0          

 

                CHLORIDE (test code=CL)    112 mmol/L      100-108          

 

                CARBON DIOXIDE (test code=CO2)    28 mmol/L       21-32            

 

                ANION GAP (test code=GAP)    5.0 GAP calc    4.0-15.0         

 

                GLUCOSE (test code=GLU)    92 MG/DL                   

 

                BLOOD UREA NITROGEN (test code=BUN)    23 MG/DL        7-18             

 

                GLOMERULAR FILTRATION RATE (test code=GFR)    37 estGFR       >60              

 

                CREATININE (test code=CREAT)    1.8 MG/DL       0.6-1.0          

 

                CALCIUM (test code=CA)    8.4 MG/DL       8.5-10.1         





CBC W/AUTO [id number] 05:21:00* 





                Test Item       Value           Reference Range    Comments

 

                WHITE BLOOD CELL (test code=WBC)    4.8 K/mm3       3.5-11.0         

 

                RED BLOOD CELL (test code=RBC)    3.95 M/mm3      4.70-6.10        

 

                HEMOGLOBIN (test code=HGB)    11.1 G/DL       10.4-14.9        

 

                HEMATOCRIT (test code=HCT)    32.1 %          31.5-44.1        

 

                MEAN CELL VOLUME (test code=MCV)    81.3 Fl         84.5-98.6        

 

                MEAN CELL HGB (test code=MCH)    28.1 pg         27.0-34.2        

 

                MEAN CELL HGB CONCETRATION (test code=MCHC)    34.6 G/DL       31.5-34.0        

 

                RED CELL DISTRIBUTION WIDTH (test code=RDW)    16.0 SD         11.5-14.5        

 

                PLATELET COUNT (test code=PLT)    266.0 K/mm3     150-450          

 

                MEAN PLATELET VOLUME (test code=MPV)    9.80 fL         7.0-10.5         

 

                NEUTROPHIL % (test code=NT%)    41.6 %          40-76            

 

                LYMPHOCYTE % (test code=LY%)    38.4 %          20.5-51.1        

 

                MONOCYTE % (test code=MO%)    13.9 %          1.7-9.3          

 

                EOSINOPHIL % (test code=EO%)    5.7 %           0.0-6.0          

 

                BASOPHIL % (test code=BA%)    0.4 %           0.0-2.0          

 

                NEUTROPHIL # (test code=NT#)    1.98 K/mm3      1.8-7.6          

 

                LYMPHOCYTE # (test code=LY#)    1.8 K/mm3       0.6-3.2          

 

                MONOCYTE # (test code=MO#)    0.7 K/mm3       0.3-1.1          

 

                EOSINOPHIL # (test code=EO#)    0.3 K/mm3       0.0-0.4          

 

                BASOPHIL # (test code=BA#)    0.0 K/mm3       0.0-0.1          

 

                MANUAL DIFF REQUIRED (test code=MDIFF)    NO DIFF/SCN     CRITERIA         





- US RETRO [id number] 15:37:00  Name: JOSHUA BORDEN                      : 1960 Age/S: 58  / F         [id number] 
[address]              Unit #: [id number]     Loc:               [place]
[address]                Phys: Wayne Koenig MD                            
              Acct: [id number]  Dis Date:               Status: ADM IN        
                         PHONE #: [phone]     Exam Date: 2019  1410  
                  FAX #:                   Reason: JENN on CKD-III               
                      EXAMS:                                               CPT: 
         [id number] US RETRO LTD                               [id number]             
      EXAM: RENAL ULTRASOUND               INDICATION: JENN on CKD-III           
   COMPARISON: None available               TECHNIQUE: Routine grayscale and 
color Doppler ultrasound examination       of the kidneys was obtained.         
     FINDINGS:        The right kidney measures 8.9 x 3.8 x 4.7 cm and the left 
kidney       measures 8.7 x 4.1 x 4.3 cm. The kidneys are normal in appearance 
with       normal cortical thickness and cortical echogenicity.  There are small
      cysts within the inferior right kidney measuring 1.5 x 1.5 x 1.5 cm       
and mid left kidney measuring 0.8 x 0.9 x 0.9 cm.  No hydronephrosis       or 
nephrolithiasis.               The urinary bladder is normal in appearance.     
           IMPRESSION:         Simple renal cysts noted within both kidneys.  No
hydronephrosis.                   LOCATION: A1                    ** 
Electronically Signed by CAITLIN Huynh **            **            on 
2019 at 1537            **                      Reported and signed by: 
Elke Huynh M.D.                 CC: Wayne Koenig MD; Ney Call MD                                                                        
                  Technologist: Litzy Ramos                                    
      [facility] Date/Time: 2019 (1537) 16                        PAGE
 1                       Signed Report                                  Name: 
JOSHUA BORDEN                      : 
1960 Age/S: 58  / F         [id number] [address]              Unit #: 
[id number]     Loc:               [place] [address]                Phys: 
Wayne Koenig MD                                           Acct: 
[id number]  Dis Date:               Status: ADM IN                            
     PHONE #: [phone]     Exam Date: 2019  1410                     
FAX #:                   Reason: JENN on CKD-III                                 
    EXAMS:                                               CPT:           [id number]
[id number]  RETRO LTD                               [id number]               <Continued
> Orig Print D/T: S: 2019 (1787)     Probe:                               
                                PAGE  2                       Signed Report     
                         UR SODIUM [id number] 12:58:00* 





                Test Item       Value           Reference Range    Comments

 

                UR SODIUM RANDOM (test code=JUNI)    20 MEQ/L                        The Reference Range and Method Performance

 specificationshave not been established for this fluid. The test resultshould 
be correlated into the clinical context forinterpretation.





URINALYSIS [id number] 12:58:00* 





                Test Item       Value           Reference Range    Comments

 

                UA COLOR (test code=COLU)    YELLOW DESCRIPT    YELLOW           

 

                UA APPEARANCE (test code=APPU)    CLEAR DESCRIPT    CLEAR            

 

                UA GLUCOSE DIPSTICK (test code=DGLUU)    NEGATIVE mg/dL    NEG              

 

                UA BILIRUBIN DIPSTICK (test code=BILU)    NEGATIVE mg/dL    NEG              

 

                UA KETONE DIPSTICK (test code=KETU)    NEGATIVE mg/dL    NEG              

 

                UA SPECIFIC GRAVITY (test code=SGU)    1.010 SG        1.005-1.030      

 

                UA BLOOD DIPSTICK (test code=SHERRIE)    NEGATIVE mg/DL    NEG              

 

                UA PH DIPSTICK (test code=KINJAL)    6.5 pH UNITS    5.0-7.0          

 

                UA PROTEIN DIPSTICK (test code=PROU)    2+ mg/dL        NEG              

 

                UA UROBILINIOGEN DIPSTICK (test code=URO)    NORMAL mg/dL    <2.0             

 

                UA NITRITE DIPSTICK (test code=JOSE JUAN)    NEGATIVE SCREEN    NEG              

 

                UA LEUKOCYTE ESTERASE DIPSTICK (test code=LEUU)    TRACE Leuk/mcL    NEGATIVE         

 

                UA WBC (test code=WBCU)    1-3 #WBC/HPF    0-3              

 

                UA RBC (test code=RBCU)    NONE SEEN #RBC/HPF    0-3              

 

                UA BACTERIA (test code=BACU)    1+ /HPF         NONE-TRACE       





UR SODIUM [id number] 12:58:00* 





                Test Item       Value           Reference Range    Comments

 

                UR SODIUM RANDOM (test code=JUNI)    20 MEQ/L        ()              The Reference Range and Method Performance

 specificationshave not been established for this fluid. The test resultshould 
be correlated into the clinical context forinterpretation.





UR PROTEIN [id number] 12:58:00* 





                Test Item       Value           Reference Range    Comments

 

                UR PROTEIN TOTAL (test code=PROTU)    255.7 MG/DL     0.0-12.0         





UR CREATININE [id number] 12:58:00* 





                Test Item       Value           Reference Range    Comments

 

                UR CREATININE RANDOM (test code=CREATU)    256.0 MG/DL                





URINALYSIS [id number] 06:59:00* 





                Test Item       Value           Reference Range    Comments

 

                UA COLOR (test code=COLU)    YELLOW DESCRIPT    YELLOW           

 

                UA APPEARANCE (test code=APPU)    CLEAR DESCRIPT    CLEAR            

 

                UA GLUCOSE DIPSTICK (test code=DGLUU)    NEGATIVE mg/dL    NEG              

 

                UA BILIRUBIN DIPSTICK (test code=BILU)    NEGATIVE mg/dL    NEG              

 

                UA KETONE DIPSTICK (test code=KETU)    NEGATIVE mg/dL    NEG              

 

                UA SPECIFIC GRAVITY (test code=SGU)    1.010 SG        1.005-1.030      

 

                UA BLOOD DIPSTICK (test code=SHERRIE)    NEGATIVE mg/DL    NEG              

 

                UA PH DIPSTICK (test code=KINJAL)    6.5 pH UNITS    5.0-7.0          

 

                UA PROTEIN DIPSTICK (test code=PROU)    2+ mg/dL        NEG              

 

                UA UROBILINIOGEN DIPSTICK (test code=URO)    NORMAL mg/dL    <2.0             

 

                UA NITRITE DIPSTICK (test code=JOSE JUAN)    NEGATIVE SCREEN    NEG              

 

                UA LEUKOCYTE ESTERASE DIPSTICK (test code=LEUU)    TRACE Leuk/mcL    NEGATIVE         

 

                UA WBC (test code=WBCU)    1-3 #WBC/HPF    0-3              

 

                UA RBC (test code=RBCU)    NONE SEEN #RBC/HPF    0-3              

 

                UA BACTERIA (test code=BACU)    1+ /HPF         NONE-TRACE       





UR SODIUM [id number] 06:59:00* 





                Test Item       Value           Reference Range    Comments

 

                UR SODIUM RANDOM (test code=JUNI)     MEQ/L                           





UR PROTEIN [id number] 06:59:00* 





                Test Item       Value           Reference Range    Comments

 

                UR PROTEIN TOTAL (test code=PROTU)    255.7 MG/DL     0.0-12.0         





UR CREATININE [id number] 06:59:00* 





                Test Item       Value           Reference Range    Comments

 

                UR CREATININE RANDOM (test code=CREATU)    256.0 MG/DL                





URINALYSIS [id number] 04:45:00* 





                Test Item       Value           Reference Range    Comments

 

                UA COLOR (test code=COLU)    YELLOW DESCRIPT    YELLOW           

 

                UA APPEARANCE (test code=APPU)    CLEAR DESCRIPT    CLEAR            

 

                UA GLUCOSE DIPSTICK (test code=DGLUU)    NEGATIVE mg/dL    NEG              

 

                UA BILIRUBIN DIPSTICK (test code=BILU)    NEGATIVE mg/dL    NEG              

 

                UA KETONE DIPSTICK (test code=KETU)    NEGATIVE mg/dL    NEG              

 

                UA SPECIFIC GRAVITY (test code=SGU)    1.010 SG        1.005-1.030      

 

                UA BLOOD DIPSTICK (test code=SHERRIE)    NEGATIVE mg/DL    NEG              

 

                UA PH DIPSTICK (test code=KINJAL)    6.5 pH UNITS    5.0-7.0          

 

                UA PROTEIN DIPSTICK (test code=PROU)    2+ mg/dL        NEG              

 

                UA UROBILINIOGEN DIPSTICK (test code=URO)    NORMAL mg/dL    <2.0             

 

                UA NITRITE DIPSTICK (test code=JOSE JUAN)    NEGATIVE SCREEN    NEG              

 

                UA LEUKOCYTE ESTERASE DIPSTICK (test code=LEUU)    TRACE Leuk/mcL    NEGATIVE         





UR SODIUM [id number] 04:45:00* 





                Test Item       Value           Reference Range    Comments

 

                UR SODIUM RANDOM (test code=JUNI)     MEQ/L                           





UR PROTEIN [id number] 04:45:00* 





                Test Item       Value           Reference Range    Comments

 

                UR PROTEIN TOTAL (test code=PROTU)    255.7 MG/DL     0.0-12.0         





UR CREATININE [id number] 04:45:00* 





                Test Item       Value           Reference Range    Comments

 

                UR CREATININE RANDOM (test code=CREATU)    256.0 MG/DL                





URINALYSIS [id number] 04:33:00* 





                Test Item       Value           Reference Range    Comments

 

                UA COLOR (test code=COLU)    YELLOW DESCRIPT    YELLOW           

 

                UA APPEARANCE (test code=APPU)    CLEAR DESCRIPT    CLEAR            

 

                UA GLUCOSE DIPSTICK (test code=DGLUU)    NEGATIVE mg/dL    NEG              

 

                UA BILIRUBIN DIPSTICK (test code=BILU)    NEGATIVE mg/dL    NEG              

 

                UA KETONE DIPSTICK (test code=KETU)    NEGATIVE mg/dL    NEG              

 

                UA SPECIFIC GRAVITY (test code=SGU)    1.010 SG        1.005-1.030      

 

                UA BLOOD DIPSTICK (test code=SHERRIE)    NEGATIVE mg/DL    NEG              

 

                UA PH DIPSTICK (test code=KINJAL)    6.5 pH UNITS    5.0-7.0          

 

                UA PROTEIN DIPSTICK (test code=PROU)    2+ mg/dL        NEG              

 

                UA UROBILINIOGEN DIPSTICK (test code=URO)    NORMAL mg/dL    <2.0             

 

                UA NITRITE DIPSTICK (test code=JOSE JUAN)    NEGATIVE SCREEN    NEG              

 

                UA LEUKOCYTE ESTERASE DIPSTICK (test code=LEUU)    TRACE Leuk/mcL    NEGATIVE         





UR SODIUM [id number] 04:33:00* 





                Test Item       Value           Reference Range    Comments

 

                UR SODIUM RANDOM (test code=JUNI)     MEQ/L                           





UR PROTEIN [id number] 04:33:00* 





                Test Item       Value           Reference Range    Comments

 

                UR PROTEIN TOTAL (test code=PROTU)     MG/DL          0.0-12.0         





UR CREATININE [id number] 04:33:00* 





                Test Item       Value           Reference Range    Comments

 

                UR CREATININE RANDOM (test code=CREATU)     MG/DL                     





RENAL FUNCTION [id number] 04:04:00* 





                Test Item       Value           Reference Range    Comments

 

                SODIUM (test code=NA)    145 mmol/L      134-147          

 

                POTASSIUM (test code=K)    3.3 mmol/L      3.4-5.0          

 

                CHLORIDE (test code=CL)    111 mmol/L      100-108          

 

                CARBON DIOXIDE (test code=CO2)    26 mmol/L       21-32            

 

                GLUCOSE (test code=GLU)    109 MG/DL                  

 

                BLOOD UREA NITROGEN (test code=BUN)    27 MG/DL        7-18             

 

                GLOMERULAR FILTRATION RATE (test code=GFR)    30 estGFR       >60              

 

                CREATININE (test code=CREAT)    2.2 MG/DL       0.6-1.0          

 

                ALBUMIN (test code=ALB)    2.6 G/DL        3.4-5.0          

 

                CALCIUM (test code=CA)    8.5 MG/DL       8.5-10.1         

 

                PHOSPHOROUS (test code=PHOS)    3.7 MG/DL       2.5-4.9          





URIC [id number] 04:04:00* 





                Test Item       Value           Reference Range    Comments

 

                URIC ACID (test code=URIC)    6.7 MG/DL       2.6-6.0          





[id number] 04:04:00* 





                Test Item       Value           Reference Range    Comments

 

                MAGNESIUM (test code=MAG)    2.2 MG/DL       1.8-2.4          





CBC W/AUTO [id number] 03:44:00* 





                Test Item       Value           Reference Range    Comments

 

                WHITE BLOOD CELL (test code=WBC)    4.6 K/mm3       3.5-11.0         

 

                RED BLOOD CELL (test code=RBC)    3.97 M/mm3      4.70-6.10        

 

                HEMOGLOBIN (test code=HGB)    11.2 G/DL       10.4-14.9        

 

                HEMATOCRIT (test code=HCT)    32.1 %          31.5-44.1        

 

                MEAN CELL VOLUME (test code=MCV)    80.9 Fl         84.5-98.6        

 

                MEAN CELL HGB (test code=MCH)    28.2 pg         27.0-34.2        

 

                MEAN CELL HGB CONCETRATION (test code=MCHC)    34.9 G/DL       31.5-34.0        

 

                RED CELL DISTRIBUTION WIDTH (test code=RDW)    15.9 SD         11.5-14.5        

 

                PLATELET COUNT (test code=PLT)    280.0 K/mm3     150-450          

 

                MEAN PLATELET VOLUME (test code=MPV)    9.50 fL         7.0-10.5         

 

                NEUTROPHIL % (test code=NT%)    47.9 %          40-76            

 

                LYMPHOCYTE % (test code=LY%)    32.7 %          20.5-51.1        

 

                MONOCYTE % (test code=MO%)    14.4 %          1.7-9.3          

 

                EOSINOPHIL % (test code=EO%)    4.8 %           0.0-6.0          

 

                BASOPHIL % (test code=BA%)    0.2 %           0.0-2.0          

 

                NEUTROPHIL # (test code=NT#)    2.20 K/mm3      1.8-7.6          

 

                LYMPHOCYTE # (test code=LY#)    1.5 K/mm3       0.6-3.2          

 

                MONOCYTE # (test code=MO#)    0.7 K/mm3       0.3-1.1          

 

                EOSINOPHIL # (test code=EO#)    0.2 K/mm3       0.0-0.4          

 

                BASOPHIL # (test code=BA#)    0.0 K/mm3       0.0-0.1          

 

                MANUAL DIFF REQUIRED (test code=MDIFF)    NO DIFF/SCN     CRITERIA         





CPK-MB [id number] 19:40:00* 





                Test Item       Value           Reference Range    Comments

 

                CREATINE KINASE (CK) (test code=CK)    96 Unit/L                  

 

                CKMB (test code=CKMBT)    1.8 NG/ML       0.0-4.9          

 

                RELATIVE % INDEX (test code=REL%)    1.8 %           0.0-2.5          





Completed by Nursing: LONZEAJJCK--20-18 19:40:00* 





                Test Item       Value           Reference Range    Comments

 

                TROPONIN-I (test code=TROPI)    0.080 NG/ML     0.000-0.045     Negative: </=0.045 Positive:

 >/=0.046 Correlation with serial results, other cardiac markers, and clinical 
findings is necessary to determine the clinical  significance of this result. 
Quantitative results using different methodologies should not be compared to one
another as numerical results may varyby method.





Completed by Nursing: NOCPK-MB [id number] 13:12:00* 





                Test Item       Value           Reference Range    Comments

 

                CREATINE KINASE (CK) (test code=CK)    113 Unit/L                 

 

                CKMB (test code=CKMBT)    1.9 NG/ML       0.0-4.9          

 

                RELATIVE % INDEX (test code=REL%)    1.6 %           0.0-2.5          





Completed by Nursing: LNOZMURNJD--03-18 13:12:00* 





                Test Item       Value           Reference Range    Comments

 

                TROPONIN-I (test code=TROPI)    0.066 NG/ML     0.000-0.045     Negative: </=0.045 Positive:

 >/=0.046 Correlation with serial results, other cardiac markers, and clinical 
findings is necessary to determine the clinical  significance of this result. 
Quantitative results using different methodologies should not be compared to one
another as numerical results may varyby method.





Completed by Nursing: NORULE OUT MI [id number] 11:54:00* 





                Test Item       Value           Reference Range    Comments

 

                CREATINE KINASE (CK) (test code=CK)    105 Unit/L                 

 

                TROPONIN-I (test code=TROPI)    0.078 NG/ML     0.000-0.045     Negative: </=0.045 Positive:

 >/=0.046 Correlation with serial results, other cardiac markers, and clinical 
findings is necessary to determine the clinical  significance of this result. 
Quantitative results using different methodologies should not be compared to one
another as numerical results may varyby method.





COMPREHENSIVE METABOLIC [id number] 05:03:00* 





                Test Item       Value           Reference Range    Comments

 

                SODIUM (test code=NA)    143 mmol/L      134-147          

 

                POTASSIUM (test code=K)    3.4 mmol/L      3.4-5.0          

 

                CHLORIDE (test code=CL)    109 mmol/L      100-108          

 

                CARBON DIOXIDE (test code=CO2)    26 mmol/L       21-32            

 

                ANION GAP (test code=GAP)    8.0 GAP calc    4.0-15.0         

 

                GLUCOSE (test code=GLU)    110 MG/DL                  

 

                BLOOD UREA NITROGEN (test code=BUN)    22 MG/DL        7-18             

 

                GLOMERULAR FILTRATION RATE (test code=GFR)    28 estGFR       >60              

 

                CREATININE (test code=CREAT)    2.3 MG/DL       0.6-1.0          

 

                TOTAL PROTEIN (test code=PROT)    6.6 G/DL        6.4-8.2          

 

                ALBUMIN (test code=ALB)    2.7 G/DL        3.4-5.0          

 

                GLOBULIN (test code=GLOB)    3.9 GM/dL                        

 

                ALBUMIN/GLOBULIN RATIO (test code=A/G)    0.7 RATIO       1.2-2.2          

 

                CALCIUM (test code=CA)    8.7 MG/DL       8.5-10.1         

 

                BILIRUBIN TOTAL (test code=BILT)    0.30 MG/DL      0.2-1.2          

 

                SGOT/AST (test code=AST)    12 Unit/L       15-37            

 

                SGPT/ALT (test code=ALT)    14 Unit/L       12-78            

 

                ALKALINE PHOSPHATASE TOTAL (test code=ALKP)    90 Unit/L                  





Comment:     FASTING IN AMLIPID PROFILE (CORONARY RISK)2019 05:03:00* 





                Test Item       Value           Reference Range    Comments

 

                TRIGLYCERIDES (test code=TRIG)    82 MG/DL        0-150            

 

                CHOLESTEROL (test code=CHOL)    165 MG/DL       133-200          

 

                CHOLESTEROL/HDL RATIO (test code=CHOLHDL)    2.58 RATIO      >0               

 

                HDL CHOLESTEROL (test code=HDL)    64 MG/DL        40-59            

 

                NON-HDL CHOLESTEROL (test code=NHDL)    101 mg/dL       <130             

 

                LIPOPROTEIN LDL (test code=LDL)    91 MG/DL        0-129            

 

                LDL/HDL (test code=LDL/HDL)    1.42 Ratio      1.48-3.22 Avg     





Comment:     FASTING IN [facility] W/AUTO [id number] 04:55:00* 





                Test Item       Value           Reference Range    Comments

 

                WHITE BLOOD CELL (test code=WBC)    4.8 K/mm3       3.5-11.0         

 

                RED BLOOD CELL (test code=RBC)    4.19 M/mm3      4.70-6.10        

 

                HEMOGLOBIN (test code=HGB)    11.9 G/DL       10.4-14.9        

 

                HEMATOCRIT (test code=HCT)    33.3 %          31.5-44.1        

 

                MEAN CELL VOLUME (test code=MCV)    79.5 Fl         84.5-98.6        

 

                MEAN CELL HGB (test code=MCH)    28.4 pg         27.0-34.2        

 

                MEAN CELL HGB CONCETRATION (test code=MCHC)    35.7 G/DL       31.5-34.0        

 

                RED CELL DISTRIBUTION WIDTH (test code=RDW)    15.6 SD         11.5-14.5        

 

                PLATELET COUNT (test code=PLT)    303.0 K/mm3     150-450          

 

                MEAN PLATELET VOLUME (test code=MPV)    9.40 fL         7.0-10.5         

 

                NEUTROPHIL % (test code=NT%)    52.1 %          40-76            

 

                LYMPHOCYTE % (test code=LY%)    32.2 %          20.5-51.1        

 

                MONOCYTE % (test code=MO%)    12.8 %          1.7-9.3          

 

                EOSINOPHIL % (test code=EO%)    2.7 %           0.0-6.0          

 

                BASOPHIL % (test code=BA%)    0.2 %           0.0-2.0          

 

                NEUTROPHIL # (test code=NT#)    2.47 K/mm3      1.8-7.6          

 

                LYMPHOCYTE # (test code=LY#)    1.5 K/mm3       0.6-3.2          

 

                MONOCYTE # (test code=MO#)    0.6 K/mm3       0.3-1.1          

 

                EOSINOPHIL # (test code=EO#)    0.1 K/mm3       0.0-0.4          

 

                BASOPHIL # (test code=BA#)    0.0 K/mm3       0.0-0.1          

 

                MANUAL DIFF REQUIRED (test code=MDIFF)    NO DIFF/SCN     CRITERIA         





- MRI BRAIN W/O [id number] 22:14:00 FAX:         Ney Call MD 
[phone]    Camps: PM  St: ADM-----------
--------------------------------------------------------------------   [facility]   [facility] [place]                      : 
1  Age/S: 58/F           [id number]              Unit #: [id number]     
Loc: L.ICU0       [place] [address]                Phys: Ney Call MD  
                                             Acct: [id number] Dis Date:       
       Status: ADM IN                                  PHONE #: [phone]    
Exam Date:     2019     1530                    FAX #:                  
Reason: ISCHEMIC STROKE                                     EXAMS:              
                                CPT:           [id number] MRI BRAIN W/O CONTRAST 
                   [id number]                    Location: T 18               MRI 
BRAIN: 19                       COMPARISON EXAM: MRA assessment of the he
ad and neck of 19 and       head CT exam of 19. The exam is also radha
ng correlated with       remote head CT exam of 17               TECHNIQUE
: MRI examination of the brain without contrast was performed       on a high fi
eld magnet . Multiplanar and multisequence technique       utilized.            
          CLINICAL HISTORY: Ischemic stroke.               FINDINGS:            
  No positive mass-effect, midline shift, extra-axial collection, or       acute
intracranial hemorrhage seen. No acute territorial infarction is       seen. 
There is no intra or extra-axial masses. Assessment of skull       base unremark
able.               There is no acute restriction or acute vascular insult on DW
I 1000 B       imaging.               On gradient echo imaging there are numerou
s tiny punctate areas of       magnetic susceptibility artifact. These extensive
ly involve the       thalami but also was seen within the white matter and in th
e basal       ganglia. The may be due to tiny areas of hypertensive hemorrhage  
    versus amyloid angiopathy. Patient noted to have significant confluent      
chronic microvascular changes in the supratentorium particular the       frontal
lobes.               As noted on the prior head CT exams there is atrophy stated
age. Old       peripheral infarct in the right thalamus. Atrophy involving both 
     cerebellar hemispheres. Do not see a subacute area of infarction. No       
chronic areas of large territorial infarction. Tiny old lacunar       infarcts 
in the thalami.               .               Normal signal void in the basilar 
artery and carotid siphons are seen.             PAGE  1                       
Signed Report                    (CONTINUED)  FAX:         Ney Call MD 
[phone]    Camps: PM  St: ADM-------------------------------------------
------------------------------------   Name:   JOSHUA BORDEN [place]                      : 1960  Age/S: 58/F           [id number]
[facility]              Unit #: [id number]      Loc: L.ICU0       [place] 
[address]                Phys: Ney Call MD                                 
              Acct: [id number] Dis Date:               Status: ADM IN         
                        PHONE #: [phone]     Exam Date:     2019     
1530                    FAX #:                  Reason: ISCHEMIC STROKE         
                           EXAMS:                                               
CPT:           [id number] MRI BRAIN W/O CONTRAST                     [id number]       
       <Continued>          IMPRESSION:                   No acute vascular 
insult is seen in this patient. There is atrophy for         stated age with 
significant but chronic-appearing confluent         microvascular changes in the
supratentorium.. There are multiple tiny         punctate areas of old 
hemorrhage possibly due to prior hypertensive         microhemorrhage versus 
amyloid angiopathy. No acute hemorrhage is         seen. Multiple small old 
lacunar infarcts identified as noted above         without large territorial 
infarction                                                                      
     ** Electronically Signed by CAITLIN Ugalde **        **       
         on 2019 at 3014                **                      Reported 
and signed by: Jaymie Ugalde M.D.                      CC: Ney Call MD                                                                        
                                            Technologist: RT Debbie(R)(MR)                               Transcribed Date/Time/By: 2019 
() :[email or web address]          Orig Print D/T: S: 2019 (7369)                
        PAGE  2                       Signed Report                             
 - MRA NECK W/O [id number] 17:17:00 FAX:         Ney Call MD 
[phone]    Camps: PM  St: ADM-----------
--------------------------------------------------------------------   Name:   JOSHUA BARTON [place]                      : 
1  Age/S: 58/F           [address]              Unit #: [id number]     
Loc: L.ICU0       [place] [address]                Phys: Ney Call MD  
                                             Acct: [id number] Dis Date:       
       Status: ADM IN                                  PHONE #: [phone]    
Exam Date:     2019     1545                    FAX #:                  
Reason: CVA                                                 EXAMS:              
                                CPT:           [id number] MRA NECK W/O CONT      
                   [id number]                    CLINICAL INFORMATION: CVA evalua
tion.               Dictation location: J9               COMPARISON: No prior   
           Technique: Time-of-flight study was viewed in the rotating frame of  
    reference.               Findings: Motion artifact reduces sensitivity of 
some of the scan       slices.  All stenoses are estimated using NACSET criteria
.               Carotid circulation: Stairstep artifact from breathing or swallo
wing       partially obscures the carotid system. Both common carotid arteries  
    and bifurcations appear patent with adequate internal and external       ca
rotid runoff.               Vertebral circulation: The vertebral arteries are co
dominant and       patent to the formation of the basilar artery.               
 IMPRESSION:         1. Moderate motion artifact reduces sensitivity of the sca
n.         2. No hemodynamically significant lesion identified.                 
 ** Electronically Signed by CAITLIN Esteves **           **             on
2019 at 1717             **                      Reported and signed by: 
Shukri Esteves M.D.               CC: Ney Call MD                     
                                                                                
              Technologist: RT Debbie(R)(MR)                            
  Transcribed Date/Time/By: 2019 (2486) :Breanna           Orig Print 
D/T: S: 2019 (6897)                         PAGE  1                       
Signed Report                               - MRA HEAD [id number] 17:15:00
FAX:         Ney Call [phone]    Camps: PM  St: ADM-----------
--------------------------------------------------------------------   Name:   JOSHUA BARTON [place]                      : 
1  Age/S: 58/F           [address]              Unit #: [id number]     
Loc: L.ICU0       [place] [address]                Phys: eNy Call MD  
                                             Acct: [id number] Dis Date:       
       Status: ADM IN                                  PHONE #: [phone]    
Exam Date:     2019     1530                    FAX #:                  
Reason: CVA                                                 EXAMS:              
                                CPT:           [id number] MRA HEAD W/CONT        
                   [id number]                    CLINICAL INFORMATION: CVA..         
     Dictation Location: B2               COMPARISON: Head CT 3/16/2019 reported
no acute finding               Technique: Time-of-flight study was viewed in the
rotating and tumbled       frame of reference.               FINDINGS:       
Anterior circulation: The carotid arteries, bifurcations, and the       anterior
and middle cerebral runoff appears unremarkable. The region       of the an
terior communicating artery appeared unremarkable.               Posterior circu
lation: Posterior communicating arteries are present.       The vertebral arteri
es, basilar artery and bifurcation, and the       posterior cerebral and superio
r cerebellar runoff appeared maintained.                 IMPRESSION:         No 
aneurysm or hemodynamically significant lesion identified.                   ** 
Electronically Signed by CAITLIN Esteves **           **             on  at 2173             **                      Reported and signed by: Aby Esteves M.D.                  CC: Ney Call MD                      
                                                                                
             Technologist: RT Debbie(R)(MR)                             
 Transcribed Date/Time/By: 2019 (0735) :SkyV           Orig Print 
D/T: S: 2019 (8460)                         PAGE  1                       
Signed Report                               GLUCOSE BEDSIDE [id number] 
12:17:00* 





                Test Item       Value           Reference Range    Comments

 

                GLUCOSE BEDSIDE TESTING (test code=GLUBED)    108 mg/dL                  





BASIC METABOLIC [id number] 05:40:00* 





                Test Item       Value           Reference Range    Comments

 

                SODIUM (test code=NA)    142 mmol/L      134-147          

 

                POTASSIUM (test code=K)    3.1 mmol/L      3.4-5.0          

 

                CHLORIDE (test code=CL)    111 mmol/L      100-108          

 

                CARBON DIOXIDE (test code=CO2)    25 mmol/L       21-32            

 

                ANION GAP (test code=GAP)    6.0 GAP calc    4.0-15.0         

 

                GLUCOSE (test code=GLU)    98 MG/DL                   

 

                BLOOD UREA NITROGEN (test code=BUN)    19 MG/DL        7-18             

 

                GLOMERULAR FILTRATION RATE (test code=GFR)    37 estGFR       >60              

 

                CREATININE (test code=CREAT)    1.8 MG/DL       0.6-1.0          

 

                CALCIUM (test code=CA)    8.8 MG/DL       8.5-10.1         





CBC W/AUTO [id number] 05:28:00* 





                Test Item       Value           Reference Range    Comments

 

                WHITE BLOOD CELL (test code=WBC)    4.6 K/mm3       3.5-11.0         

 

                RED BLOOD CELL (test code=RBC)    4.12 M/mm3      4.70-6.10        

 

                HEMOGLOBIN (test code=HGB)    11.7 G/DL       10.4-14.9        

 

                HEMATOCRIT (test code=HCT)    32.8 %          31.5-44.1        

 

                MEAN CELL VOLUME (test code=MCV)    79.6 Fl         84.5-98.6        

 

                MEAN CELL HGB (test code=MCH)    28.4 pg         27.0-34.2        

 

                MEAN CELL HGB CONCETRATION (test code=MCHC)    35.7 G/DL       31.5-34.0        

 

                RED CELL DISTRIBUTION WIDTH (test code=RDW)    15.5 SD         11.5-14.5        

 

                PLATELET COUNT (test code=PLT)    300.0 K/mm3     150-450          

 

                MEAN PLATELET VOLUME (test code=MPV)    9.40 fL         7.0-10.5         

 

                NEUTROPHIL % (test code=NT%)    56.9 %          40-76            

 

                LYMPHOCYTE % (test code=LY%)    26.8 %          20.5-51.1        

 

                MONOCYTE % (test code=MO%)    11.5 %          1.7-9.3          

 

                EOSINOPHIL % (test code=EO%)    4.6 %           0.0-6.0          

 

                BASOPHIL % (test code=BA%)    0.2 %           0.0-2.0          

 

                NEUTROPHIL # (test code=NT#)    2.61 K/mm3      1.8-7.6          

 

                LYMPHOCYTE # (test code=LY#)    1.2 K/mm3       0.6-3.2          

 

                MONOCYTE # (test code=MO#)    0.5 K/mm3       0.3-1.1          

 

                EOSINOPHIL # (test code=EO#)    0.2 K/mm3       0.0-0.4          

 

                BASOPHIL # (test code=BA#)    0.0 K/mm3       0.0-0.1          

 

                MANUAL DIFF REQUIRED (test code=MDIFF)    NO DIFF/SCN     CRITERIA         





RULE OUT MI [id number] 02:07:00* 





                Test Item       Value           Reference Range    Comments

 

                CREATINE KINASE (CK) (test code=CK)    180 Unit/L                 

 

                TROPONIN-I (test code=TROPI)    0.065 NG/ML     0.000-0.045     Negative: </=0.045 Positive:

 >/=0.046 Correlation with serial results, other cardiac markers, and clinical 
findings is necessary to determine the clinical  significance of this result. 
Quantitative results using different methodologies should not be compared to one
another as numerical results may varyby method.





CPK-MB [id number] 23:07:00* 





                Test Item       Value           Reference Range    Comments

 

                CREATINE KINASE (CK) (test code=CK)    180 Unit/L                 

 

                CKMB (test code=CKMBT)    3.4 NG/ML       0.0-4.9          

 

                RELATIVE % INDEX (test code=REL%)    1.8 %           0.0-2.5          





Completed by Nursing: [id number] 23:07:00* 





                Test Item       Value           Reference Range    Comments

 

                TROPONIN-I (test code=TROPI)    0.057 NG/ML     0.000-0.045     Negative: </=0.045 Positive:

 >/=0.046 Correlation with serial results, other cardiac markers, and clinical 
findings is necessary to determine the clinical  significance of this result. 
Quantitative results using different methodologies should not be compared to one
another as numerical results may varyby method.





Completed by Nursing: NOPROTHROMBIN [id number] 18:45:00* 





                Test Item       Value           Reference Range    Comments

 

                PT PATIENT (test code=PTP)    11.7 SECONDS    9.3-12.9         

 

                INTERNATIONAL NORMAL RATIO (test code=INR)    1.02 INR Unit    0.8-1.2          





THROMBOPLASTIN TIME [id number] 18:45:00* 





                Test Item       Value           Reference Range    Comments

 

                THROMBOPLASTIN TIME PARTIAL (test code=PTT)    33.0 SECONDS    26-35            





TROPONIN I [id number] 18:43:00* 





                Test Item       Value           Reference Range    Comments

 

                TROPONIN I RAPID (test code=TROPIRAP)    0.04 ng/mL      0.00-0.08       - The use of serial

 sampling and testing protocol is a  recommended practice- An elevated troponin 
level alone is often not sufficient  for diagnosis of myocardial infarction.





CBC W/O [id number] 18:34:00* 





                Test Item       Value           Reference Range    Comments

 

                WHITE BLOOD CELL (test code=WBC)    5.4 K/mm3       3.5-11.0         

 

                RED BLOOD CELL (test code=RBC)    4.22 M/mm3      4.70-6.10        

 

                HEMOGLOBIN (test code=HGB)    11.9 G/DL       10.4-14.9        

 

                HEMATOCRIT (test code=HCT)    33.8 %          31.5-44.1        

 

                MEAN CELL VOLUME (test code=MCV)    80.1 Fl         84.5-98.6        

 

                MEAN CELL HGB (test code=MCH)    28.2 pg         27.0-34.2        

 

                MEAN CELL HGB CONCETRATION (test code=MCHC)    35.2 G/DL       31.5-34.0        

 

                RED CELL DISTRIBUTION WIDTH (test code=RDW)    15.7 SD         11.5-14.5        

 

                PLATELET COUNT (test code=PLT)    317.0 K/mm3     150-450          

 

                MEAN PLATELET VOLUME (test code=MPV)    9.60 fL         7.0-10.5         





CHEMISTRY 8 [id number] 18:32:00* 





                Test Item       Value           Reference Range    Comments

 

                ISTAT-SODIUM (test code=NAP)     mmol/L         135-146          

 

                ISTAT-POTASSIUM (test code=KP)     mmol/L         3.5-4.9          

 

                ISTAT-CHLORIDE (test code=CLP)     mmol/L                    

 

                ISTAT-CARBON DIOXIDE (test code=ISTAT-CO2)     mmol/L         24-29            

 

                ISTAT CALCIUM IONIZED (test code=ISTAT-HIEN)     mmol/L         1.12-1.32        

 

                ISTAT-GLUCOSE (test code=GLUP)     mg/dL                     

 

                ISTAT-BUN (test code=BUNP)     mg/dL          8-26             

 

                BEDSIDE CREATININE (test code=CREATBED)     mg/dL          0.6-1.3          

 

                GLOMERULAR FILTRATION RATE POC (test code=GFRBED)    28                         





CHEMISTRY 8 [id number] 18:32:00* 





                Test Item       Value           Reference Range    Comments

 

                ISTAT-SODIUM (test code=NAP)    143 mmol/L      135-146          

 

                ISTAT-POTASSIUM (test code=KP)    3.7 mmol/L      3.5-4.9          

 

                ISTAT-CHLORIDE (test code=CLP)    103 mmol/L                 

 

                ISTAT-CARBON DIOXIDE (test code=ISTAT-CO2)    27 mmol/L       24-29            

 

                ISTAT CALCIUM IONIZED (test code=ISTAT-HIEN)    1.23 mmol/L     1.12-1.32        

 

                ISTAT-GLUCOSE (test code=GLUP)    130 mg/dL                  

 

                ISTAT-BUN (test code=BUNP)    23 mg/dL        8-26             

 

                BEDSIDE CREATININE (test code=CREATBED)    2.3 mg/dL       0.6-1.3          

 

                GLOMERULAR FILTRATION RATE POC (test code=GFRBED)    28                         





- CT HEAD/BRAIN W/O [id number] 18:30:00  Name: JOSHUA BORDEN                
    [facility]                      : 1960 Age/S: 58  / F         
[address]              Unit #: [id number]     Loc:               
[place] [address]                Phys: Jaymie Santizo MD                      
                           Acct: [id number]  Dis Date:               Status: 
PRE ER                                  PHONE #: [phone]     Exam Date: 
2019  2001                     FAX #:                   Reason: Code 
Stroke                                         EXAMS:                           
                   CPT:           [id number] CT HEAD/BRAIN W/O CONT              
      [id number]                    Location: T18               CT head, 19    
          COMPARISON EXAMS:Head CT exam of 17                TECHNIQUE: CT
examination of the brain was performed without contrast       on a helical 
scanner. Scanning conducted from skull base to vertex in       the axial plane 
acquiring contiguous 5mm slice thickness .  The       examination was performed 
on a [facility] at helical CT scanner utilizing       low-dose radiation technique. 
Automatic exposure control timing was       utilized to minimize radiation dose.
              CLINICAL HISTORY: Code stroke               FINDINGS: There is 
atrophy for stated age with significant but       chronic-appearing 
microvascular changes having a confluent appearance       in the supratentorium.
Atrophy also seen of both cerebellar       hemispheres involving both lateral 
hemispheres. These findings appear       similar to the prior exam performed in 
2017 without evolving       space-occupying process or definite acute 
area of infarction.               There is no evolving midline shift or int
racranial hemorrhages seen.       In particular, no subarachnoid hemorrhage is i
dentified. No intra or       extra-axial masses. Bone windows unremarkable. No s
ignificant sinus       disease is noted. No acute territorial infarction is seen
.                 IMPRESSION:                   Atrophy and significant chronic 
microvascular changes. No definite         evolving territorial infarction, spac
e-occupying processes or         intracranial hemorrhage                        
              Findings have been discussed with the Dr Santizo in the emergency ro
om on         19 at 6:29 PM.                ** Electronically Signed by CYNDY Ugalde **        **                 on 2019 at 1830    
           **                      Reported and signed by: Jaymie hercules M.D.        PAGE  1                       Signed Report                    (
CONTINUED)   Name: JOSHUA BORDEN              
       : 1960 Age/S: 58  / F         [id number]              U
nit #: [id number]     Loc:               [place] [address]                Phys:
Jaymie Santizo MD                                                  Acct: 
[id number]  Dis Date:               Status: PRE ER                                 
PHONE #: [phone]     Exam Date: 2019  1813                     FAX #:
                  Reason: Code Stroke                                         
EXAMS:                                               CPT:           [id number] CT
HEAD/BRAIN W/O CONT                     [id number]               <Continued>         
                              CC: Jaymie Santizo MD                             
                                                                                
        Technologist:Jonathan Avila, RT(R)(MR)        CTDI:        DLP:        
Trnscb Date/Time: 2019 () [email or web address]                       Orig Print 
D/T: S: 2019 ()       CTDI:          DLP:         PAGE  2             
         Signed Report                               RAD, SHOULDER, COMPLETE 
(MIN 2 VIEWS), [id number] 10:34:00Reason for exam:->FALLIs the patient 
pregnant?->NoShould this be performed at the bedside?->NoFINAL REPORT PATIENT 
ID:   [id number]  Radiograph of the left shoulder Reason for exam: FALL 
Comparison:  No priors Discussion:  There is mild degenerative change at the 
left AC joint, and left glenohumeral joint. No displaced fracture, or di
slocation is identified. Visualized soft tissues are unremarkable. Impressions: 
No acute osseous abnormality is identified. Signed: Clari Dunlap Verified 
Date/Time:  2019 10:34:18 Reading Location: [facility]        Electronically signed by: CLARI DUNLAP M.D. on 2019 10:34 AM

## 2020-04-17 NOTE — DIAGNOSTIC IMAGING REPORT
EXAM: CT Abdomen and Pelvis WITH intravenous contrast  



INDICATION: Abdominal pain, rectal bleeding



COMPARISON: None.



TECHNIQUE: Abdomen and pelvis were scanned utilizing a multidetector helical

scanner from the lung base to the pubic symphysis after administration of IV

contrast. Coronal and sagittal reformations were obtained. Routine protocol was

performed. Scan was performed during portal venous phase.

     

IV CONTRAST: 100mL of Isovue 370

ORAL CONTRAST: Water



RADIATION DOSE:

Total DLP:  316 mGy*cm



Dose modulation, iterative reconstruction, and/or weight based adjustment of

the mA/kV was utilized to reduce the radiation dose to as low as reasonably

achievable. 



FINDINGS:

LOWER THORAX: Catheter tip in the right atrium.



HEPATOBILIARY: No focal liver lesion. No biliary ductal dilation. Status post

cholecystectomy.



SPLEEN: No splenomegaly.



PANCREAS: No focal masses or ductal dilatation.



ADRENALS: 1.9 cm left adrenal nodule. 1.9 cm right adrenal nodule. 



KIDNEYS/URETERS: No hydronephrosis or renal calculi. Bilateral simple renal

cysts.

PELVIC ORGANS/BLADDER: Unremarkable.



PERITONEUM / RETROPERITONEUM: No free air or fluid.

LYMPH NODES: No lymphadenopathy.

VESSELS: Mild atherosclerotic calcifications of the nonaneurysmal abdominal

aorta and major branches.



GI TRACT: No abnormal bowel thickening. No bowel obstruction. Normal appendix.



BONES AND SOFT TISSUES: No acute osseous injury. No suspicious lytic or blastic

lesions. Mild diffuse subcutaneous soft tissue edema.



IMPRESSION: 

No acute findings in the abdomen or pelvis.



Bilateral adrenal nodules. These nodules are indeterminate based on attenuation

but most likely represent benign adenomas. Follow-up adrenal mass protocol is

recommended in 12 months. If the nodules are stable at that time, no further

follow-up imaging will be necessary.







Signed by: Yamilet Malave MD on 4/17/2020 4:59 PM

## 2020-04-18 VITALS — DIASTOLIC BLOOD PRESSURE: 90 MMHG | SYSTOLIC BLOOD PRESSURE: 129 MMHG

## 2020-04-18 VITALS — SYSTOLIC BLOOD PRESSURE: 123 MMHG | DIASTOLIC BLOOD PRESSURE: 95 MMHG

## 2020-04-18 VITALS — SYSTOLIC BLOOD PRESSURE: 119 MMHG | DIASTOLIC BLOOD PRESSURE: 78 MMHG

## 2020-04-18 VITALS — DIASTOLIC BLOOD PRESSURE: 78 MMHG | SYSTOLIC BLOOD PRESSURE: 119 MMHG

## 2020-04-18 VITALS — SYSTOLIC BLOOD PRESSURE: 135 MMHG | DIASTOLIC BLOOD PRESSURE: 73 MMHG

## 2020-04-18 VITALS — DIASTOLIC BLOOD PRESSURE: 95 MMHG | SYSTOLIC BLOOD PRESSURE: 137 MMHG

## 2020-04-18 VITALS — SYSTOLIC BLOOD PRESSURE: 107 MMHG | DIASTOLIC BLOOD PRESSURE: 66 MMHG

## 2020-04-18 VITALS — SYSTOLIC BLOOD PRESSURE: 118 MMHG | DIASTOLIC BLOOD PRESSURE: 70 MMHG

## 2020-04-18 VITALS — SYSTOLIC BLOOD PRESSURE: 124 MMHG | DIASTOLIC BLOOD PRESSURE: 72 MMHG

## 2020-04-18 VITALS — DIASTOLIC BLOOD PRESSURE: 72 MMHG | SYSTOLIC BLOOD PRESSURE: 124 MMHG

## 2020-04-18 LAB
ALBUMIN SERPL-MCNC: 1.7 G/DL (ref 3.5–5)
ALBUMIN/GLOB SERPL: 0.4 {RATIO} (ref 0.8–2)
ALP SERPL-CCNC: 204 IU/L (ref 40–150)
ALT SERPL-CCNC: 421 IU/L (ref 0–55)
ANION GAP SERPL CALC-SCNC: 10.5 MMOL/L (ref 8–16)
ANION GAP SERPL CALC-SCNC: 11.4 MMOL/L (ref 8–16)
ANION GAP SERPL CALC-SCNC: 29 MMOL/L (ref 8–16)
BASOPHILS # BLD AUTO: 0.1 10*3/UL (ref 0–0.1)
BASOPHILS NFR BLD AUTO: 1.2 % (ref 0–1)
BUN SERPL-MCNC: 12 MG/DL (ref 7–26)
BUN SERPL-MCNC: 13 MG/DL (ref 7–26)
BUN SERPL-MCNC: 27 MG/DL (ref 7–26)
BUN/CREAT SERPL: 6 (ref 6–25)
BUN/CREAT SERPL: 6 (ref 6–25)
BUN/CREAT SERPL: 7 (ref 6–25)
CALCIUM SERPL-MCNC: 8.3 MG/DL (ref 8.4–10.2)
CALCIUM SERPL-MCNC: 8.7 MG/DL (ref 8.4–10.2)
CALCIUM SERPL-MCNC: < 2 MG/DL (ref 8.4–10.2)
CHLORIDE SERPL-SCNC: 104 MMOL/L (ref 98–107)
CHLORIDE SERPL-SCNC: 112 MMOL/L (ref 98–107)
CHLORIDE SERPL-SCNC: 98 MMOL/L (ref 98–107)
CK SERPL-CCNC: 800 IU/L (ref 29–168)
CO2 SERPL-SCNC: 21 MMOL/L (ref 22–29)
CO2 SERPL-SCNC: 22 MMOL/L (ref 22–29)
CO2 SERPL-SCNC: 26 MMOL/L (ref 22–29)
DEPRECATED INR PLAS: 1.44
DEPRECATED NEUTROPHILS # BLD AUTO: 4.9 10*3/UL (ref 2.1–6.9)
EGFRCR SERPLBLD CKD-EPI 2021: 14 ML/MIN (ref 60–?)
EGFRCR SERPLBLD CKD-EPI 2021: 26 ML/MIN (ref 60–?)
EGFRCR SERPLBLD CKD-EPI 2021: 30 ML/MIN (ref 60–?)
EOSINOPHIL # BLD AUTO: 0.4 10*3/UL (ref 0–0.4)
EOSINOPHIL NFR BLD AUTO: 3.1 % (ref 0–6)
EOSINOPHIL NFR BLD MANUAL: 2 % (ref 0–7)
ERYTHROCYTE [DISTWIDTH] IN CORD BLOOD: 18.2 % (ref 11.7–14.4)
GLOBULIN PLAS-MCNC: 3.9 G/DL (ref 2.3–3.5)
GLUCOSE SERPLBLD-MCNC: 57 MG/DL (ref 74–118)
GLUCOSE SERPLBLD-MCNC: 60 MG/DL (ref 74–118)
GLUCOSE SERPLBLD-MCNC: 77 MG/DL (ref 74–118)
HCT VFR BLD AUTO: 20.9 % (ref 34.2–44.1)
HCT VFR BLD AUTO: 21.1 % (ref 34.2–44.1)
HCT VFR BLD AUTO: 31 % (ref 34.2–44.1)
HGB BLD-MCNC: 10.8 G/DL (ref 12–16)
HGB BLD-MCNC: 7.4 G/DL (ref 12–16)
HGB BLD-MCNC: 7.4 G/DL (ref 12–16)
LYMPHOCYTES # BLD: 3.7 10*3/UL (ref 1–3.2)
LYMPHOCYTES NFR BLD AUTO: 33.2 % (ref 18–39.1)
LYMPHOCYTES NFR BLD MANUAL: 30 % (ref 19–48)
MCH RBC QN AUTO: 28.4 PG (ref 28–32)
MCHC RBC AUTO-ENTMCNC: 35.4 G/DL (ref 31–35)
MCV RBC AUTO: 80.1 FL (ref 81–99)
MONOCYTES # BLD AUTO: 2.1 10*3/UL (ref 0.2–0.8)
MONOCYTES NFR BLD AUTO: 18.9 % (ref 4.4–11.3)
MONOCYTES NFR BLD MANUAL: 15 % (ref 3.4–9)
NEUTS SEG NFR BLD AUTO: 43.3 % (ref 38.7–80)
NEUTS SEG NFR BLD MANUAL: 53 % (ref 40–74)
PLATELET # BLD AUTO: 121 X10E3/UL (ref 140–360)
POTASSIUM SERPL-SCNC: 3.4 MMOL/L (ref 3.5–5.1)
POTASSIUM SERPL-SCNC: 4.5 MMOL/L (ref 3.5–5.1)
POTASSIUM SERPL-SCNC: > 10 MMOL/L (ref 3.5–5.1)
PROTHROMBIN TIME: 18.5 SECONDS (ref 11.9–14.5)
RBC # BLD AUTO: 2.61 X10E6/UL (ref 3.6–5.1)
SODIUM SERPL-SCNC: 138 MMOL/L (ref 136–145)
SODIUM SERPL-SCNC: 138 MMOL/L (ref 136–145)
SODIUM SERPL-SCNC: 140 MMOL/L (ref 136–145)

## 2020-04-18 PROCEDURE — 5A1D70Z PERFORMANCE OF URINARY FILTRATION, INTERMITTENT, LESS THAN 6 HOURS PER DAY: ICD-10-PCS | Performed by: INTERNAL MEDICINE

## 2020-04-18 RX ADMIN — CLONIDINE HYDROCHLORIDE SCH MG: 0.1 TABLET ORAL at 14:00

## 2020-04-18 RX ADMIN — CLONIDINE HYDROCHLORIDE SCH MG: 0.1 TABLET ORAL at 21:24

## 2020-04-18 RX ADMIN — SODIUM CHLORIDE SCH MLS/HR: 9 INJECTION, SOLUTION INTRAVENOUS at 21:15

## 2020-04-18 RX ADMIN — LOSARTAN POTASSIUM SCH MG: 100 TABLET, FILM COATED ORAL at 08:29

## 2020-04-18 RX ADMIN — SODIUM CHLORIDE SCH MG: 900 INJECTION INTRAVENOUS at 08:07

## 2020-04-18 RX ADMIN — METOPROLOL TARTRATE SCH MG: 25 TABLET, FILM COATED ORAL at 21:15

## 2020-04-18 RX ADMIN — SODIUM CHLORIDE SCH MLS/HR: 9 INJECTION, SOLUTION INTRAVENOUS at 00:49

## 2020-04-18 RX ADMIN — METOPROLOL TARTRATE SCH MG: 25 TABLET, FILM COATED ORAL at 08:29

## 2020-04-18 RX ADMIN — SODIUM CHLORIDE SCH MG: 900 INJECTION INTRAVENOUS at 21:15

## 2020-04-18 RX ADMIN — CLONIDINE HYDROCHLORIDE SCH MG: 0.1 TABLET ORAL at 06:00

## 2020-04-18 RX ADMIN — SODIUM CHLORIDE SCH MLS/HR: 9 INJECTION, SOLUTION INTRAVENOUS at 11:00

## 2020-04-18 NOTE — CONSULTATION
DATE OF CONSULTATION:  04/18/2020

 

Cardiology Consultation 

 

REQUESTING PHYSICIAN:  Kit Driver MD

 

REASON FOR CONSULTATION:  Elevated troponin.

 

HISTORY OF PRESENT ILLNESS:  This is a 59-year-old woman with recent cerebrovascular

accident, paroxysmal atrial fibrillation, hypertensive heart disease, hyperlipidemia,

end-stage renal disease, on hemodialysis, who was transferred to [facility]

from [facility] due to rectal bleeding.  In the ER, she was found to have a

hemoglobin of 6.6, AST of 689, ALT of 471, and alkaline phosphatase of 265.  Troponin

was mildly elevated at 0.549 for which Cardiology is consulted for evaluation.  The

patient denies any chest pain, palpitations, or lightheadedness.  She does report

shortness of breath, but denies any fever or chills. 

 

REVIEW OF SYSTEMS:

Negative except as per HPI.

 

PAST MEDICAL HISTORY:  

1. History of recent cerebrovascular accident.

2. Paroxysmal atrial fibrillation.

3. Hypertensive heart disease.

4. Hyperlipidemia.

5. End-stage renal disease, on hemodialysis.

 

PAST SURGICAL HISTORY:  

1. Cholecystectomy.

2. Dialysis catheter insertion.

 

ALLERGIES:  PLEASE SEE EMR.

 

MEDICATIONS:  Please see medication list.

 

SOCIAL HISTORY:  No tobacco or alcohol use.

 

PHYSICAL EXAMINATION:

VITAL SIGNS:  Temperature 97.8 degrees, pulse 107, respiratory rate 20, blood pressure

123/95, and oxygen saturation 100% on room air. 

GENERAL:  Awake, alert, well developed, well nourished, in no acute distress. 

HEENT:  Normocephalic, atraumatic.  Pupils are equal.  No scleral icterus. 

NECK:  Supple.  No thyromegaly or cervical lymphadenopathy.  No carotid bruits. 

LUNGS:  Clear to auscultation bilaterally.  No wheezes or crackles. 

CARDIOVASCULAR:  Normal rate, regular rhythm.  Normal S1, S2. 

ABDOMEN:  Soft, nontender. 

EXTREMITIES:  No edema. 

SKIN:  Intact.

LABORATORY DATA:  WBC 11.25, hemoglobin 7.4, hematocrit 20.9, and platelets 121.  Sodium

140, potassium 4.5, chloride 112, CO2 of 22, BUN 27, and creatinine 3.98.  Troponin I

0.570. 

 

EKG normal sinus rhythm with prolonged QT.  Chest x-ray, mild pulmonary interstitial

edema. 

 

IMPRESSION:  

1. Elevated troponin.

2. Acute-on-chronic anemia, secondary to gastrointestinal bleeding.

3. Acute gastrointestinal bleeding.

4. Paroxysmal atrial fibrillation.

5. Hypertensive heart disease.

6. Hyperlipidemia.

7. End-stage renal disease, on hemodialysis.

8. Elevated LFTs.

9. History of cerebrovascular accident.

 

RECOMMENDATIONS:  Given the patient's acute bleeding.  Hold anti-platelet therapy and

anticoagulation.  The patient's elevated troponin is not consistent with ACS, possibly

secondary to acute anemia as well as end-stage renal disease.  Obtain echocardiogram.

Transfuse patient.  Further evaluation of bleeding per GI.  Check BNP.  The patient

would likely benefit from further ultrafiltration, given finding of pulmonary edema on

chest x-ray.  Volume management per Nephrology due to dialysis.  Continue atorvastatin

and metoprolol. 

 

Thank you for this consult.  We will continue to follow.

 

 

 

 

______________________________

Marleny Kamara MD

 

ABS/MODL

D:  04/18/2020 17:34:47

T:  04/18/2020 19:02:41

Job #:  [id number]

## 2020-04-18 NOTE — DIAGNOSTIC IMAGING REPORT
EXAMINATION:  CHEST SINGLE (PORTABLE)    



INDICATION: ESRD and possible non elevated ST myocardial infarction



COMPARISON:  None

     

FINDINGS:

TUBES and LINES:  Right IJ tunneled hemodialysis catheter terminates at the

cavoatrial junction.



LUNGS: Low lung volumes with central vascular congestion and mild interstitial

opacities. No evidence of lobar pneumonia.



PLEURA:  No pleural effusion or pneumothorax. 



HEART AND MEDIASTINUM:  The cardiomediastinal silhouette is unremarkable. There

are atherosclerotic calcifications within the aorta.



BONES AND SOFT TISSUES:  No acute osseous lesion.  Soft tissues are

unremarkable.



UPPER ABDOMEN: No free air under the diaphragm.    



IMPRESSION: 

Mild pulmonary interstitial edema.



Signed by: Dr. Cara Cunha MD on 4/18/2020 10:50 AM

## 2020-04-18 NOTE — HISTORY AND PHYSICAL
CHIEF COMPLAINT:  "I have rectal bleeding."

 

HISTORY OF PRESENT ILLNESS:  This is a 59-year-old  woman, who 
presents

to [facility] Emergency Room with a 2-day history of 
bloody

stools.  The patient is on Eliquis and aspirin therapy.  The patient states a 
month ago

she had a stroke and since then it affected her memory.  The patient was 
actually

transferred from a local skilled nursing facility, namely [facility]

to [facility] because of rectal bleeding.  In 
the

emergency room, the patient was found to have a hemoglobin 6.6 g/dL; the patient
was

transfused 2 units of packed red blood cells, then today at 7.4 g/dL.  Also, in 
the

emergency room, the patient is found to have potassium 5.1.  BUN and creatinine 
were 19

and 3.42 respectively.  The patient has end-stage renal disease and undergoes

hemodialysis 3 times a week.  The patient's AST and ALT on admission were 689 
and 471

respectively.  Alkaline phosphatase level was 265.  Creatine kinase level was 
907

yesterday.  Bilirubin is elevated at 3.3.  Albumin was low at 1.8.  On 
admission, she

underwent a CT of the abdomen and pelvis with contrast that did not reveal any 
acute

findings, but did reveal evidence of previous cholecystectomy.  A 12-lead EKG 
performed

in the emergency room, revealed a normal sinus rhythm. 

 

REVIEW OF SYSTEMS:

GENERAL:  Weight is stable.  The patient states she has been more forgetful 
since her

stroke a month ago.  Denies any fever or chills. 

HEENT:  No headaches.  No visual changes. 

CARDIOVASCULAR/RESPIRATORY:  Denies any chest pain, short of breath, or cough. 

GI:  Complains of bloody stools for the last 2 days.  Denies abdominal pain. 

:  No UTI symptoms.  The patient states urinates minimally since she is on 
dialysis. 

NEUROMUSCULAR:  No limb weakness or numbness, but the patient states she has not
walked

in at least a month since her stroke. 

 

ALLERGIES:  NO KNOWN DRUG ALLERGIES.

 

PAST MEDICAL HISTORY:  

1. Cerebrovascular disease (recent stroke).

2. Parkinson disease.

3. Mild cognitive impairment.

4. Paroxysmal atrial fibrillation.

5. Hypertensive heart disease.

6. End-stage renal disease.

7. Dyslipidemia.

8. Anemia, secondary to chronic kidney disease.

9. Chronic constipation, resolved.

 

FAMILY HISTORY:  Multiple members hypertensive heart disease, but no family 
members with

end-stage renal disease. 

 

SOCIAL HISTORY:  This woman is single.  She lives with adult niece.  She is 
disabled.

No history of tobacco or alcohol use.  The patient was hospitalized at [facility] for a week and then transferred to the [facility] 
according to

the patient. 

 

SURGICAL HISTORY:  

1. Cholecystectomy.

2. Right subclavian hemodialysis catheter placement.

 

MEDICATIONS:  

1. Apixaban 5 mg b.i.d.

2. Aspirin 81 mg daily.

3. Atorvastatin 80 mg at bedtime.

4. Carbidopa/levodopa 23.75/95 one q.6 hours.

5. Vitamin D3 5000 units daily.

6. Clonidine 0.1 q.8 hours.

7. Colace 100 mg daily.

8. Losartan 100 mg daily.

9. Metoprolol tartrate 25 mg b.i.d.

10. Ondansetron 4 mg q.4 hours p.r.n. nausea, vomiting.

11. Polyethylene glycol 17 g daily.

12. Potassium chloride 20 mEq b.i.d.

13. Spironolactone 25 mg b.i.d.

 

PHYSICAL EXAMINATION:

GENERAL:  She is awake, alert, and oriented.  Seems to get confused easily, but 
she is

very pleasant and cooperative.  She does not appeared to be in any distress. 

VITAL SIGNS:  Height 5 feet 0 inches, weight 135 pounds, BMI 26.  Blood pressure
is

120/80, pulse 76, respiratory rate 16, oxygen 99%, and temperature 98.7. 

INTEGUMENT:  Skin is warm and dry.  Slight pallor.  No jaundice or diaphoresis. 

HEENT:  Anicteric sclerae.  Moist mucous membranes. 

NECK:  Supple. 

CARDIOVASCULAR:  Distant heart sounds.  Regular rate and rhythm with a 
holosystolic

murmur 4/6 intensity.  S3 gallop appreciated. 

LUNGS:  No rales.  No rhonchi or wheezes. 

ABDOMEN:  Soft.  Normal bowel sounds.  Nontender. 

EXTREMITIES:  No deformity. 

NEUROLOGIC:  She is bedbound.  She seems to have right facial drooping, but no 
other

focal deficits appreciated. 



DIAGNOSES:  

1. Acute-on-chronic anemia, secondary to gastrointestinal bleeding.

2. Acute gastrointestinal bleeding.

3. End-stage renal disease.

4. Hypertensive heart disease.

5. Rhabdomyolysis.

6. Obstructive jaundice.

7. Elevated hepatic transaminases.

8. Elevated troponin I level.

9. Parkinson disease.

10. Cerebrovascular accident (history of recent stroke according to the 
patient).

 

PLAN:  

1. Hold aspirin and apixaban since the patient having acute gastrointestinal 
bleeding.

2. Transfused another 2 units packed red blood cells.

3. Consult Nephrology since the patient will need hemodialysis today.

4. Consult Cardiology since she has had elevated troponin I levels.

5. Consult Gastroenterology for acute gastrointestinal bleeding.

6. We will hold spironolactone, losartan, and potassium chloride since the 
patient has

mild hyperkalemia. 

7. Restart carbidopa/levodopa for Parkinson disease.

8. Hold atorvastatin since the patient has elevated hepatic transaminases.

9. Order chest x-ray.

10. We will follow creatine kinase level.

11. Follow hemoglobin/hematocrit.

12. We will obtain records from [facility]. 

 

I spent 45 minutes in the care of this patient.

 

 

 

 

______________________________

MD MELBA Klein/CHERYL

D:  04/18/2020 09:26:51

T:  04/18/2020 10:31:57

Job #:  [id number]

 

MTDD

## 2020-04-18 NOTE — CONSULTATION
DATE OF CONSULTATION:  04/18/2020

 

Nephrology Consultation Note 

 

REASON FOR CONSULTATION:  ESRD.

 

REFERRING PHYSICIAN:  ER physician.

 

HISTORY OF PRESENT ILLNESS:  This is a 59-year-old female with past medical history of

ESRD on hemodialysis on Tuesday, Thursday, Saturday schedule with last dialysis two days

ago; hypertension, was admitted with bleeding from rectum.  Her hemoglobin was noted to

be 6.6.  At the time of my examination, she appeared in no acute distress and denied any

headache, blurring of vision, chest pain, shortness of breath, cough, phlegm, fever,

chills, abdominal pain, skin rash, joint pains, or any focal weakness, but did complain

of bleeding for a couple of days now. 

 

PAST MEDICAL AND SURGICAL HISTORY:  As above.

 

PERSONAL AND SOCIAL HISTORY:  No history of alcohol or tobacco.

 

CURRENT MEDICATIONS:  See the medication sheet that was reviewed.

 

PHYSICAL EXAMINATION:

GENERAL:  She appeared in no acute distress. 

VITAL SIGNS:  Blood pressure was 119/78, respirations 16, heart rate 75, temperature

98.7. 

HEENT:  Head was atraumatic and normocephalic.  Pupils were reactive to light.  Mouth,

oral mucosa was moist. 

NECK:  Supple. 

CHEST:  Revealed fair air entry. 

HEART:  S1 and S2. 

ABDOMEN:  Soft. Bowel sounds were positive. 

EXTREMITIES:  Trace edema. 

CNS:  She was awake and alert.  Cranial nerves are intact.  There was no gross motor

deficit noted. 

LABS:  White cell count 11.2, hemoglobin 7.4, it was 6.6 yesterday, hematocrit 20.9,

platelets 121.  Sodium 140, potassium 4.5, chloride 112, CO2 of 22, BUN 27, creatinine

3.9. 

 

IMPRESSION:  

1. End-stage renal disease, on hemodialysis on Tuesday, Thursday, Saturday schedule with

no evidence of volume overload or hyperkalemia. 

2. Gastrointestinal bleed with anemia.

 

PLAN:  Strict I's and O's.  Hepatitis surface antigen, CBC and BMP in a.m. Epogen 10,000

units subcu after dialysis later today.  She will be dialyzed for 3-1/2 hours. 140

sodium, 35 bicarbonate, 2 potassium, 2.5 calcium, 350-400 blood flow, 700-800 dialysis

flow with UF of about 2 L as tolerated.  She will also receive 2 units of packed RBCs on

dialysis.  Further recommendations to follow. 

 

Thank you for the consultation.

 

 

 

 

______________________________

MD NANCY Mathur

D:  04/18/2020 10:34:03

T:  04/18/2020 11:13:39

Job #:  [id number]

## 2020-04-19 VITALS — SYSTOLIC BLOOD PRESSURE: 129 MMHG | DIASTOLIC BLOOD PRESSURE: 81 MMHG

## 2020-04-19 VITALS — SYSTOLIC BLOOD PRESSURE: 132 MMHG | DIASTOLIC BLOOD PRESSURE: 70 MMHG

## 2020-04-19 VITALS — SYSTOLIC BLOOD PRESSURE: 129 MMHG | DIASTOLIC BLOOD PRESSURE: 90 MMHG

## 2020-04-19 VITALS — SYSTOLIC BLOOD PRESSURE: 112 MMHG | DIASTOLIC BLOOD PRESSURE: 73 MMHG

## 2020-04-19 VITALS — DIASTOLIC BLOOD PRESSURE: 71 MMHG | SYSTOLIC BLOOD PRESSURE: 123 MMHG

## 2020-04-19 VITALS — SYSTOLIC BLOOD PRESSURE: 118 MMHG | DIASTOLIC BLOOD PRESSURE: 70 MMHG

## 2020-04-19 VITALS — DIASTOLIC BLOOD PRESSURE: 73 MMHG | SYSTOLIC BLOOD PRESSURE: 112 MMHG

## 2020-04-19 VITALS — DIASTOLIC BLOOD PRESSURE: 81 MMHG | SYSTOLIC BLOOD PRESSURE: 129 MMHG

## 2020-04-19 LAB
ALBUMIN SERPL-MCNC: 1.7 G/DL (ref 3.5–5)
ALBUMIN/GLOB SERPL: 0.4 {RATIO} (ref 0.8–2)
ALP SERPL-CCNC: 199 IU/L (ref 40–150)
ALT SERPL-CCNC: 102 IU/L (ref 0–55)
ANION GAP SERPL CALC-SCNC: 10.7 MMOL/L (ref 8–16)
BUN SERPL-MCNC: 17 MG/DL (ref 7–26)
BUN/CREAT SERPL: 6 (ref 6–25)
CALCIUM SERPL-MCNC: 8.3 MG/DL (ref 8.4–10.2)
CHLORIDE SERPL-SCNC: 106 MMOL/L (ref 98–107)
CK SERPL-CCNC: 927 IU/L (ref 29–168)
CO2 SERPL-SCNC: 24 MMOL/L (ref 22–29)
DEPRECATED INR PLAS: 1.55
EGFRCR SERPLBLD CKD-EPI 2021: 21 ML/MIN (ref 60–?)
FERRITIN SERPL-MCNC: 1867.69 NG/ML (ref 4.63–204)
GLOBULIN PLAS-MCNC: 4 G/DL (ref 2.3–3.5)
GLUCOSE SERPLBLD-MCNC: 80 MG/DL (ref 74–118)
HCT VFR BLD AUTO: 25.7 % (ref 34.2–44.1)
HCT VFR BLD AUTO: 25.9 % (ref 34.2–44.1)
HCT VFR BLD AUTO: 26.6 % (ref 34.2–44.1)
HCT VFR BLD AUTO: 27.7 % (ref 34.2–44.1)
HGB BLD-MCNC: 8.9 G/DL (ref 12–16)
HGB BLD-MCNC: 9 G/DL (ref 12–16)
HGB BLD-MCNC: 9.3 G/DL (ref 12–16)
HGB BLD-MCNC: 9.9 G/DL (ref 12–16)
IRON SATN MFR SERPL: 86 % (ref 15–50)
IRON SERPL-MCNC: 178 UG/DL (ref 50–170)
POTASSIUM SERPL-SCNC: 3.7 MMOL/L (ref 3.5–5.1)
PROTHROMBIN TIME: 19.6 SECONDS (ref 11.9–14.5)
SODIUM SERPL-SCNC: 137 MMOL/L (ref 136–145)
TIBC SERPL-MCNC: 206 UG/DL (ref 261–478)
TRANSFERRIN SERPL-MCNC: 147 MG/DL (ref 180–382)

## 2020-04-19 RX ADMIN — METOPROLOL TARTRATE SCH MG: 25 TABLET, FILM COATED ORAL at 21:11

## 2020-04-19 RX ADMIN — CLONIDINE HYDROCHLORIDE SCH MG: 0.1 TABLET ORAL at 21:11

## 2020-04-19 RX ADMIN — SODIUM CHLORIDE SCH MLS/HR: 9 INJECTION, SOLUTION INTRAVENOUS at 20:30

## 2020-04-19 RX ADMIN — SODIUM CHLORIDE SCH MLS/HR: 9 INJECTION, SOLUTION INTRAVENOUS at 10:49

## 2020-04-19 RX ADMIN — LOSARTAN POTASSIUM SCH MG: 100 TABLET, FILM COATED ORAL at 10:00

## 2020-04-19 RX ADMIN — SODIUM CHLORIDE SCH MG: 900 INJECTION INTRAVENOUS at 09:00

## 2020-04-19 RX ADMIN — SODIUM CHLORIDE SCH MLS/HR: 9 INJECTION, SOLUTION INTRAVENOUS at 23:16

## 2020-04-19 RX ADMIN — SODIUM CHLORIDE SCH MG: 900 INJECTION INTRAVENOUS at 21:11

## 2020-04-19 RX ADMIN — METOPROLOL TARTRATE SCH MG: 25 TABLET, FILM COATED ORAL at 10:00

## 2020-04-19 RX ADMIN — CLONIDINE HYDROCHLORIDE SCH MG: 0.1 TABLET ORAL at 05:26

## 2020-04-19 RX ADMIN — CLONIDINE HYDROCHLORIDE SCH MG: 0.1 TABLET ORAL at 15:00

## 2020-04-19 NOTE — PROGRESS NOTE
DATE:  04/19/2020  

 

CHIEF COMPLAINT/HISTORY OF PRESENT ILLNESS:  This is a 59-year-old  
American

woman, whose primary treating diagnosis is acute gastrointestinal bleeding.  
Nursing

staff states that the patient had obvious bloody stools this morning.  The 
patient has

been transfused a total of 4 units of blood during this hospitalization.  
Hemoglobin

today is 9 g/dL, last night it was 10.8 g/dL.  The patient's BUN and creatinine 
today is

17 and 2.75 respectively.  The patient is a dialysis patient.  Serum iron 178 
today.

TIBC is 206.  Percent iron saturation is 86.  The patient's ferritin level is 
1867.  AST

and  and 102 respectively.  Alkaline phosphatase 199.  Creatine kinase 
levels

927.  Troponin I level 0.545.  The patient underwent chest x-ray yesterday on 
April 18, 2020, which revealed mild pulmonary interstitial edema. 

 

REVIEW OF SYSTEMS:

As per HPI.

 

PHYSICAL EXAMINATION:

GENERAL:  She is somnolent, but arousable.  She looks very weak.  She does not 
appear to

be any acute respiratory distress. 

VITAL SIGNS:  Blood pressure 110/72, pulse is 68, respiratory rate 16, 
temperature 98.9,

and oxygen saturation 98% on room air. 

INTEGUMENT:  Skin is warm and dry.  She has slight pallor.  She has slight 
jaundice. 

HEENT:  Anicteric sclerae with moist mucous membranes. 

NECK:  Supple. 

CARDIOVASCULAR:  Distant heart sounds.  Regular rate and rhythm with an S3 
gallop. 

LUNGS:  Osmani faintly coarse breath sounds bilaterally. 

ABDOMEN:  Benign. 

EXTREMITIES:  No edema or deformity. 

NEUROLOGIC:  She is bedbound.



DIAGNOSES:  

1. Acute gastrointestinal bleeding.

2. End-stage renal disease.

3. Rhabdomyolysis.

4. Paroxysmal atrial fibrillation.

5. History of cerebrovascular accident.

6. Obstructive jaundice.

 

PLAN:  

1. Agree with MRCP because the patient has obstructive jaundice.

2. Follow hemoglobin and hematocrit.

3. We will hold all anticoagulation medications. 



I spent 25 minutes in the care of this patient.

 

 

 

 

______________________________

MD MELBA Klein/CHERYL

D:  04/19/2020 09:15:22

T:  04/19/2020 09:38:42

Job #:  [id number]

 

SYED

## 2020-04-19 NOTE — PROGRESS NOTE
DATE:  04/19/2020

 

Cardiology Progress Note. 

 

SUBJECTIVE:  The patient denies chest pain or shortness of breath.  The patient was

reported to have bloody stools this morning. 

 

OBJECTIVE:  VITAL SIGNS:  Temperature 98.2 degrees, pulse 70, respiratory rate 18, blood

pressure 132/70, oxygen saturation 98% on room air. 

GENERAL:  Awake, alert, in no acute distress. 

LUNGS:  Clear to auscultation bilaterally.  No wheezes or crackles. 

CARDIOVASCULAR:  Normal rate, regular rhythm.  No murmur.  Normal S1, S2. 

ABDOMEN:  Soft, nontender. 

EXTREMITIES:  No edema.

 

CARDIAC MEDICATIONS:  

1. Clonidine 0.1 mg p.o. q.8 hours.

2. Metoprolol tartrate 25 mg p.o. q.12 hours.

3. Losartan 100 mg p.o. daily.

4. Atorvastatin 80 mg p.o. at bedtime.

 

LABORATORY DATA:  Hemoglobin 9.3, hematocrit 26.6.  Sodium 137, potassium 3.7, chloride

106, CO2 of 24, BUN 17, and creatinine 2.75, , , alkaline phosphatase 199.

 Troponin 0.545. 

 

IMAGING:  Telemetry was personally reviewed and interpreted, revealing normal sinus

rhythm. 

 

IMPRESSION:  

1. Acute anemia secondary to GI bleeding.

2. Acute GI bleeding.

3. Elevated troponin.

4. Paroxysmal atrial fibrillation.

5. Hypertensive heart disease.

6. Hyperlipidemia.

7. End-stage renal disease, on hemodialysis.

8. History of cerebrovascular accident.

9. Elevated LFTs.

 

RECOMMENDATIONS:  Given patient's acute bleeding, we will hold anti-platelet therapy and

anticoagulation at this time.  The patient's elevated troponin is not consistent with

ACS, possibly secondary to acute anemia as well as end-stage renal disease, obtain

echocardiogram.  Transfuse as necessary.  Evaluation of bleeding and elevated LFTs per

GI.  Volume management per Nephrology as the patient is on hemodialysis.  Continue

current antihypertensive therapy.  Given abnormal LFTs, we will hold atorvastatin for

now. 

 

Thank you for this consult.  We will continue to follow.

 

 

 

 

______________________________

Marleny Kamara MD

 

ABS/MODL

D:  04/19/2020 17:36:11

T:  04/19/2020 18:09:28

Job #:  [id number]

## 2020-04-19 NOTE — DIAGNOSTIC IMAGING REPORT
EXAM: MRI of the abdomen without contrast with MRCP



INDICATION: Abdominal liver enzymes. Abdominal pain. Rectal bleeding..



COMPARISON:   None. Correlation with CT abdomen pelvis dated 4/17/2020..



TECHNIQUE: Multiplanar and multisequence imaging was performed of the abdomen.

T1 and T2-weighted images were obtained with and without contrast. T1-weighted

in and out-of-phase.



M.R.C.P. technique: Multiplanar, multisequence MRCP was performed, with

sequences including coronal turbo spin-echo T1-weighted scans, [facility] MRCP scans,

coronal spin, coronal MPR 2,  [facility] 3D HR, [facility] MRCP ALMEIDA.





Discussion: Examination limited by image degradation by breathing motion

artifact.



LOWER THORAX: Unremarkable.  



HEPATOBILIARY: The common bile duct is mildly prominent measuring 3.8 cm in

diameter without filling defects or strictures.  No focal hepatic lesions. Mild

central intrahepatic biliary dilatation.



GALLBLADDER: Surgically absent.



SPLEEN: No splenomegaly. 



PANCREAS: No focal masses or ductal dilatation.  



ADRENALS: Bilateral adrenal nodules are best visualized and characterized on

the recent CT examination of the abdomen.



KIDNEYS/URETERS: Kidneys enhance symmetrically.  No hydronephrosis. No cystic

or solid mass lesions.  No stones.



GI TRACT: No abnormal distention, wall thickening, or evidence of bowel

obstruction.



LYMPH NODES: No lymphadenopathy.



VESSELS: Grossly unremarkable.



PERITONEUM / RETROPERITONEUM: No free air or fluid.



BONES: Unremarkable.



SOFT TISSUES: Mild diffuse subcutaneous edema particularly

Suggestive of anasarca.



IMPRESSION: 



1. Limited examination due to significant image degradation by breathing motion

artifact. Within limitations, mild central intrahepatic biliary dilatation,

possibly reflect reservoir effect status post cholecystectomy. 



2. No definite choledocholithiasis.



Signed by: Dr. YUNIOR Mazariegos M.D. on 4/19/2020 10:40 AM

## 2020-04-20 VITALS — DIASTOLIC BLOOD PRESSURE: 71 MMHG | SYSTOLIC BLOOD PRESSURE: 121 MMHG

## 2020-04-20 VITALS — DIASTOLIC BLOOD PRESSURE: 67 MMHG | SYSTOLIC BLOOD PRESSURE: 110 MMHG

## 2020-04-20 VITALS — SYSTOLIC BLOOD PRESSURE: 139 MMHG | DIASTOLIC BLOOD PRESSURE: 79 MMHG

## 2020-04-20 VITALS — SYSTOLIC BLOOD PRESSURE: 110 MMHG | DIASTOLIC BLOOD PRESSURE: 67 MMHG

## 2020-04-20 VITALS — SYSTOLIC BLOOD PRESSURE: 118 MMHG | DIASTOLIC BLOOD PRESSURE: 67 MMHG

## 2020-04-20 VITALS — SYSTOLIC BLOOD PRESSURE: 115 MMHG | DIASTOLIC BLOOD PRESSURE: 71 MMHG

## 2020-04-20 VITALS — SYSTOLIC BLOOD PRESSURE: 139 MMHG | DIASTOLIC BLOOD PRESSURE: 76 MMHG

## 2020-04-20 VITALS — SYSTOLIC BLOOD PRESSURE: 132 MMHG | DIASTOLIC BLOOD PRESSURE: 80 MMHG

## 2020-04-20 VITALS — DIASTOLIC BLOOD PRESSURE: 71 MMHG | SYSTOLIC BLOOD PRESSURE: 115 MMHG

## 2020-04-20 LAB
ALBUMIN SERPL-MCNC: 1.8 G/DL (ref 3.5–5)
ALBUMIN/GLOB SERPL: 0.4 {RATIO} (ref 0.8–2)
ALP SERPL-CCNC: 198 IU/L (ref 40–150)
ALT SERPL-CCNC: 189 IU/L (ref 0–55)
ANION GAP SERPL CALC-SCNC: 11.3 MMOL/L (ref 8–16)
BASOPHILS # BLD AUTO: 0.1 10*3/UL (ref 0–0.1)
BASOPHILS NFR BLD AUTO: 0.8 % (ref 0–1)
BUN SERPL-MCNC: 24 MG/DL (ref 7–26)
BUN/CREAT SERPL: 7 (ref 6–25)
CALCIUM SERPL-MCNC: 8.7 MG/DL (ref 8.4–10.2)
CHLORIDE SERPL-SCNC: 112 MMOL/L (ref 98–107)
CO2 SERPL-SCNC: 19 MMOL/L (ref 22–29)
DEPRECATED APTT PLAS QN: 41.1 SECONDS (ref 23.8–35.5)
DEPRECATED INR PLAS: 1.48
DEPRECATED NEUTROPHILS # BLD AUTO: 6.4 10*3/UL (ref 2.1–6.9)
EGFRCR SERPLBLD CKD-EPI 2021: 16 ML/MIN (ref 60–?)
EOSINOPHIL # BLD AUTO: 0.3 10*3/UL (ref 0–0.4)
EOSINOPHIL NFR BLD AUTO: 2.8 % (ref 0–6)
ERYTHROCYTE [DISTWIDTH] IN CORD BLOOD: 17.9 % (ref 11.7–14.4)
GLOBULIN PLAS-MCNC: 4.2 G/DL (ref 2.3–3.5)
GLUCOSE SERPLBLD-MCNC: 110 MG/DL (ref 74–118)
HCT VFR BLD AUTO: 23.1 % (ref 34.2–44.1)
HCT VFR BLD AUTO: 25.6 % (ref 34.2–44.1)
HCT VFR BLD AUTO: 25.9 % (ref 34.2–44.1)
HGB BLD-MCNC: 8 G/DL (ref 12–16)
HGB BLD-MCNC: 8.6 G/DL (ref 12–16)
HGB BLD-MCNC: 8.9 G/DL (ref 12–16)
LYMPHOCYTES # BLD: 3.2 10*3/UL (ref 1–3.2)
LYMPHOCYTES NFR BLD AUTO: 27.2 % (ref 18–39.1)
MCH RBC QN AUTO: 28.6 PG (ref 28–32)
MCHC RBC AUTO-ENTMCNC: 34.4 G/DL (ref 31–35)
MCV RBC AUTO: 83.3 FL (ref 81–99)
MONOCYTES # BLD AUTO: 1.8 10*3/UL (ref 0.2–0.8)
MONOCYTES NFR BLD AUTO: 15 % (ref 4.4–11.3)
NEUTS SEG NFR BLD AUTO: 53.9 % (ref 38.7–80)
PLATELET # BLD AUTO: 102 X10E3/UL (ref 140–360)
POTASSIUM SERPL-SCNC: 3.3 MMOL/L (ref 3.5–5.1)
PROTHROMBIN TIME: 18.9 SECONDS (ref 11.9–14.5)
RBC # BLD AUTO: 3.11 X10E6/UL (ref 3.6–5.1)
SODIUM SERPL-SCNC: 139 MMOL/L (ref 136–145)

## 2020-04-20 RX ADMIN — LOSARTAN POTASSIUM SCH MG: 100 TABLET, FILM COATED ORAL at 08:41

## 2020-04-20 RX ADMIN — SODIUM CHLORIDE SCH MG: 900 INJECTION INTRAVENOUS at 19:29

## 2020-04-20 RX ADMIN — CLONIDINE HYDROCHLORIDE SCH MG: 0.1 TABLET ORAL at 05:28

## 2020-04-20 RX ADMIN — SODIUM BICARBONATE SCH MG: 650 TABLET ORAL at 16:38

## 2020-04-20 RX ADMIN — CLONIDINE HYDROCHLORIDE SCH MG: 0.1 TABLET ORAL at 22:29

## 2020-04-20 RX ADMIN — SODIUM CHLORIDE SCH MG: 900 INJECTION INTRAVENOUS at 08:41

## 2020-04-20 RX ADMIN — CLONIDINE HYDROCHLORIDE SCH MG: 0.1 TABLET ORAL at 14:00

## 2020-04-20 RX ADMIN — METOPROLOL TARTRATE SCH MG: 25 TABLET, FILM COATED ORAL at 08:41

## 2020-04-20 RX ADMIN — SODIUM CHLORIDE SCH MLS/HR: 9 INJECTION, SOLUTION INTRAVENOUS at 22:30

## 2020-04-20 RX ADMIN — SODIUM CHLORIDE SCH MLS/HR: 9 INJECTION, SOLUTION INTRAVENOUS at 11:35

## 2020-04-20 RX ADMIN — METOPROLOL TARTRATE SCH MG: 25 TABLET, FILM COATED ORAL at 22:29

## 2020-04-20 NOTE — PROGRESS NOTE
DATE:  04/20/2020  

 

SUBJECTIVE:  The patient denies chest pain or shortness of breath.

 

OBJECTIVE:  VITAL SIGNS:  Temperature 97.9, pulse 57, respiratory rate 20, blood

pressure 121/71, and oxygen saturation 100% on room air. 

GENERAL:  Awake, alert, in no acute distress. 

LUNGS:  Clear to auscultation bilaterally.  No wheezes or crackles. 

CARDIOVASCULAR:  Normal rate.  Regular rhythm.  No murmur.  Normal S1, S2. 

ABDOMEN:  Nontender. 

EXTREMITIES:  No edema.

 

CARDIAC MEDICATIONS:  

1. Metoprolol tartrate 25 mg p.o. q.12 hours.

2. Losartan 100 mg p.o. daily.

3. Clonidine 0.1 mg p.o. q.8 hours.

 

LABORATORY DATA:  WBC 11.8, hemoglobin 8.9, hematocrit 25.9, and platelets 102.  Sodium

139, potassium 3.3, chloride 112, CO2 19, BUN 24, and creatinine 3.47.  , ALT

189, and alkaline phosphatase 198.  Telemetry was personally reviewed and interpreted

revealing normal sinus rhythm. 

 

IMPRESSION:  

1. Acute anemia secondary to gastrointestinal bleeding.

2. Acute gastrointestinal bleeding.

3. Elevated troponin.

4. Paroxysmal atrial fibrillation.

5. Hypertensive heart disease.

6. Hyperlipidemia.

7. End-stage renal disease, on hemodialysis.

8. History of cerebrovascular accident.

9. Elevated LFTs.

 

RECOMMENDATIONS:  Given the patient's acute bleeding, we will hold anti-platelet therapy

and anticoagulation at this time.  The patient's troponin elevation is not consistent

with ACS, possibly secondary to acute anemia as well as end-stage renal disease.  Obtain

echocardiogram.  We will review images once available.  Transfuse as necessary.

Evaluation of bleeding and elevated LFTs per GI.  Volume management per Nephrology given

the patient is on hemodialysis.  Continue current antihypertensive therapies.  Blood

pressure is well controlled, atorvastatin pending further evaluation of elevated LFTs.

We will continue. 

 

 

 

 

______________________________

Marleny Kamara MD

 

[facility]

D:  04/20/2020 16:57:16

T:  04/20/2020 18:52:21

Job #:  [id number]

## 2020-04-21 VITALS — SYSTOLIC BLOOD PRESSURE: 132 MMHG | DIASTOLIC BLOOD PRESSURE: 73 MMHG

## 2020-04-21 VITALS — DIASTOLIC BLOOD PRESSURE: 68 MMHG | SYSTOLIC BLOOD PRESSURE: 116 MMHG

## 2020-04-21 VITALS — SYSTOLIC BLOOD PRESSURE: 109 MMHG | DIASTOLIC BLOOD PRESSURE: 60 MMHG

## 2020-04-21 VITALS — SYSTOLIC BLOOD PRESSURE: 126 MMHG | DIASTOLIC BLOOD PRESSURE: 67 MMHG

## 2020-04-21 VITALS — DIASTOLIC BLOOD PRESSURE: 73 MMHG | SYSTOLIC BLOOD PRESSURE: 132 MMHG

## 2020-04-21 VITALS — DIASTOLIC BLOOD PRESSURE: 85 MMHG | SYSTOLIC BLOOD PRESSURE: 126 MMHG

## 2020-04-21 LAB
BASOPHILS # BLD AUTO: 0.1 10*3/UL (ref 0–0.1)
BASOPHILS NFR BLD AUTO: 0.8 % (ref 0–1)
DEPRECATED NEUTROPHILS # BLD AUTO: 3.9 10*3/UL (ref 2.1–6.9)
EOSINOPHIL # BLD AUTO: 0.2 10*3/UL (ref 0–0.4)
EOSINOPHIL NFR BLD AUTO: 2.6 % (ref 0–6)
ERYTHROCYTE [DISTWIDTH] IN CORD BLOOD: 17.9 % (ref 11.7–14.4)
HCT VFR BLD AUTO: 23.2 % (ref 34.2–44.1)
HGB BLD-MCNC: 8.1 G/DL (ref 12–16)
LYMPHOCYTES # BLD: 2.5 10*3/UL (ref 1–3.2)
LYMPHOCYTES NFR BLD AUTO: 32.3 % (ref 18–39.1)
MCH RBC QN AUTO: 29 PG (ref 28–32)
MCHC RBC AUTO-ENTMCNC: 34.9 G/DL (ref 31–35)
MCV RBC AUTO: 83.2 FL (ref 81–99)
MONOCYTES # BLD AUTO: 1 10*3/UL (ref 0.2–0.8)
MONOCYTES NFR BLD AUTO: 13.1 % (ref 4.4–11.3)
NEUTS SEG NFR BLD AUTO: 50.8 % (ref 38.7–80)
PLATELET # BLD AUTO: 104 X10E3/UL (ref 140–360)
RBC # BLD AUTO: 2.79 X10E6/UL (ref 3.6–5.1)

## 2020-04-21 PROCEDURE — 0W3P8ZZ CONTROL BLEEDING IN GASTROINTESTINAL TRACT, VIA NATURAL OR ARTIFICIAL OPENING ENDOSCOPIC: ICD-10-PCS | Performed by: INTERNAL MEDICINE

## 2020-04-21 RX ADMIN — SODIUM BICARBONATE SCH MG: 650 TABLET ORAL at 09:00

## 2020-04-21 RX ADMIN — CLONIDINE HYDROCHLORIDE SCH MG: 0.1 TABLET ORAL at 22:00

## 2020-04-21 RX ADMIN — LOSARTAN POTASSIUM SCH MG: 100 TABLET, FILM COATED ORAL at 09:00

## 2020-04-21 RX ADMIN — SODIUM CHLORIDE SCH MG: 900 INJECTION INTRAVENOUS at 23:56

## 2020-04-21 RX ADMIN — CLONIDINE HYDROCHLORIDE SCH MG: 0.1 TABLET ORAL at 14:00

## 2020-04-21 RX ADMIN — SODIUM BICARBONATE SCH MG: 650 TABLET ORAL at 17:37

## 2020-04-21 RX ADMIN — SODIUM CHLORIDE SCH MG: 900 INJECTION INTRAVENOUS at 08:57

## 2020-04-21 RX ADMIN — METOPROLOL TARTRATE SCH MG: 25 TABLET, FILM COATED ORAL at 21:00

## 2020-04-21 RX ADMIN — CLONIDINE HYDROCHLORIDE SCH MG: 0.1 TABLET ORAL at 06:00

## 2020-04-21 RX ADMIN — METOPROLOL TARTRATE SCH MG: 25 TABLET, FILM COATED ORAL at 09:00

## 2020-04-21 NOTE — PROGRESS NOTE
DATE:  04/21/2020

 

Cardiology Progress Note 

 

SUBJECTIVE:  The patient denies chest pain or shortness of breath.  She complains of

feeling tired today. 

 

OBJECTIVE:  VITAL SIGNS:  Temperature 97.6 degrees, respiratory rate 16, and blood

pressure 116/60, and oxygen saturation 97%. 

GENERAL:  Awake, alert, in no acute distress. 

LUNGS:  Clear to auscultation bilaterally.  No wheezes or crackles. 

CARDIOVASCULAR:  Normal rate, regular rhythm.  A 3/6 systolic murmur at the left lower

sternal border.  Normal S1, S2. 

ABDOMEN:  Soft, nontender. 

EXTREMITIES:  No edema.

 

CARDIAC MEDICATIONS:  

1. Metoprolol tartrate 25 mg p.o. q.12 hours.

2. Losartan 100 mg p.o. daily.

 

LABORATORY DATA:  WBC 7.7, hemoglobin 8.1, hematocrit 22.2, and platelets 104.

 

IMPRESSION:  

1. Acute anemia, secondary to gastrointestinal bleeding.

2. Acute gastrointestinal bleeding.

3. Elevated troponin.

4. Paroxysmal atrial fibrillation.

5. Hypertensive heart disease.

6. Hyperlipidemia.

7. End-stage renal disease, on hemodialysis.

8. History of cerebrovascular accident.

9. Elevated LFTs.

 

RECOMMENDATIONS:  Given the patient's acute bleeding, we will hold antiplatelet therapy,

anticoagulation at this time.  The patient's troponin elevation is not consistent with

ACS, possibly secondary to acute anemia as well as end-stage renal disease.  Obtain

echo, we will review images once available.  Transfuse as necessary.  Evaluation of

bleeding and elevated LFTs per GI.  Volume management per Nephrology, given the patient

is on hemodialysis.  Continue current antihypertensive therapies.  Blood pressure is

well controlled and atorvastatin is on hold due to elevated LFTs.  Consider

re-initiation as LFTs improved. 

 

Thank you for this consult.  We will continue to follow.

 

 

 

 

______________________________

Marleny Kamara MD

 

[facility]

D:  04/21/2020 11:28:42

T:  04/21/2020 12:20:55

Job #:  [id number]

## 2020-04-22 VITALS — SYSTOLIC BLOOD PRESSURE: 112 MMHG | DIASTOLIC BLOOD PRESSURE: 62 MMHG

## 2020-04-22 VITALS — SYSTOLIC BLOOD PRESSURE: 127 MMHG | DIASTOLIC BLOOD PRESSURE: 83 MMHG

## 2020-04-22 VITALS — SYSTOLIC BLOOD PRESSURE: 107 MMHG | DIASTOLIC BLOOD PRESSURE: 65 MMHG

## 2020-04-22 VITALS — DIASTOLIC BLOOD PRESSURE: 69 MMHG | SYSTOLIC BLOOD PRESSURE: 122 MMHG

## 2020-04-22 RX ADMIN — SODIUM CHLORIDE SCH MG: 900 INJECTION INTRAVENOUS at 09:00

## 2020-04-22 RX ADMIN — SODIUM BICARBONATE SCH MG: 650 TABLET ORAL at 09:00

## 2020-04-22 RX ADMIN — SODIUM BICARBONATE SCH MG: 650 TABLET ORAL at 15:56

## 2020-04-22 RX ADMIN — LOSARTAN POTASSIUM SCH MG: 100 TABLET, FILM COATED ORAL at 09:00

## 2020-04-22 RX ADMIN — METOPROLOL TARTRATE SCH MG: 25 TABLET, FILM COATED ORAL at 09:00

## 2020-04-22 RX ADMIN — CLONIDINE HYDROCHLORIDE SCH MG: 0.1 TABLET ORAL at 06:00

## 2020-04-22 RX ADMIN — CLONIDINE HYDROCHLORIDE SCH MG: 0.1 TABLET ORAL at 14:00

## 2020-04-22 NOTE — OPERATIVE REPORT
DATE OF PROCEDURE:  04/21/2020

 

SURGEON:  Kavin Castro MD

 

PROCEDURE:  Colonoscopy with fulguration of AVMs.

 

INDICATIONS FOR COLONOSCOPY:  Rectal bleeding.

 

MEDICATIONS:  The patient was done under MAC, please see anesthesiologist's note.

 

PROCEDURE IN DETAIL:  With the patient in left lateral decubitus position, a flexible

fiberoptic Olympus colonoscope was inserted into the rectum with ease and advanced all

the way to the cecum.  Multiple AVMs were noted in the ascending colon, some of which

were actively bleeding and those were fulgurated with a size 10-[employer] [place]ian __________.  A

single diverticulum is noted in the ascending colon.  Transverse, descending, sigmoid

and rectum grossly appeared to be within normal limits.  The scope was then retroflexed

into the distal rectum and the area around the dentate line appeared to be within normal

limits.  The scope was then straightened out, it was subsequently withdrawn.  The

patient tolerated the procedure well. 

 

IMPRESSION:  

1. Multiple arteriovenous malformations, ascending colon, some actively bleeding were

fulgurated with a size 10-[place] Lucian __________. 

2. Diverticulosis, minimal.

 

PLAN:  Follow H and H.

 

 

 

 

______________________________

Kavin Castro MD

 

[facility] [place]

D:  04/21/2020 12:53:06

T:  04/21/2020 13:38:01

Job #:  [id number]

 

cc:            MD Luis Enrique Martinez MD

## 2020-04-22 NOTE — PROGRESS NOTE
DATE:  04/22/2020

 

Cardiology Progress Note 

 

SUBJECTIVE:  The patient denies chest pain or shortness of breath.

 

OBJECTIVE:  VITAL SIGNS:  Temperature 98.1 degrees, pulse 61, respiratory rate 20, blood

pressure 122/69, oxygen saturation 98% on room air. 

GENERAL:  Awake, alert, in no acute distress. 

LUNGS:  Clear to auscultation bilaterally.  No wheezes or crackles. 

CARDIOVASCULAR:  Normal rate, regular rhythm.  A 2/6 systolic murmur in the left lower

sternal border.  Normal S1 and S2. 

ABDOMEN:  Soft and nontender. 

EXTREMITIES:  No edema.

 

CARDIAC MEDICATIONS:  Clonidine 0.1 mg p.o. q.8 hours, metoprolol tartrate 25 mg p.o.

q.12 hours, losartan 100 mg p.o. daily. 

 

LABORATORY DATA:  None.

 

IMPRESSION:  

1. Acute anemia secondary to gastrointestinal bleeding.

2. Acute gastrointestinal bleeding.

3. Elevated troponin.

4. Paroxysmal atrial fibrillation.

5. Hypertensive heart disease.

6. Hyperlipidemia.

7. End-stage renal disease, on hemodialysis.

8. History of cerebrovascular accident.

9. Elevated LFTs.

 

RECOMMENDATIONS:  Given the patient's acute bleeding, we will hold antiplatelet therapy

and anticoagulation at this time.  The patient's troponin elevation is not consistent

with ACS, possibly secondary to acute anemia as well as end-stage renal disease.

Transfuse for goal hemoglobin greater than 8.  Evaluation of bleeding and elevated LFTs

per GI.  Volume management per Nephrology given the patient is on hemodialysis.

Continue current antihypertensive therapy.  Blood pressure is well controlled.

Atorvastatin is currently on hold due to elevated LFTs, resume as LFTs improve. 

 

Thank you for this consult.  We will continue to follow.

 

 

 

 

______________________________

Marleny Kamara MD

 

[facility]

D:  04/22/2020 16:17:34

T:  04/22/2020 19:37:10

Job #:  [id number]

## 2020-06-30 ENCOUNTER — HOSPITAL ENCOUNTER (INPATIENT)
Dept: HOSPITAL 88 - ER | Age: 60
LOS: 3 days | Discharge: NURSING, REHAB OR LONG-TERM CARE | DRG: 177 | End: 2020-07-03
Attending: INTERNAL MEDICINE | Admitting: INTERNAL MEDICINE
Payer: COMMERCIAL

## 2020-06-30 VITALS — DIASTOLIC BLOOD PRESSURE: 90 MMHG | SYSTOLIC BLOOD PRESSURE: 214 MMHG

## 2020-06-30 VITALS — BODY MASS INDEX: 24.27 KG/M2 | WEIGHT: 151 LBS | HEIGHT: 66 IN

## 2020-06-30 VITALS — SYSTOLIC BLOOD PRESSURE: 214 MMHG | DIASTOLIC BLOOD PRESSURE: 90 MMHG

## 2020-06-30 DIAGNOSIS — Z90.49: ICD-10-CM

## 2020-06-30 DIAGNOSIS — G20: ICD-10-CM

## 2020-06-30 DIAGNOSIS — J12.89: ICD-10-CM

## 2020-06-30 DIAGNOSIS — Z99.2: ICD-10-CM

## 2020-06-30 DIAGNOSIS — I48.0: ICD-10-CM

## 2020-06-30 DIAGNOSIS — I69.311: ICD-10-CM

## 2020-06-30 DIAGNOSIS — J15.9: ICD-10-CM

## 2020-06-30 DIAGNOSIS — U07.1: Primary | ICD-10-CM

## 2020-06-30 DIAGNOSIS — N18.6: ICD-10-CM

## 2020-06-30 DIAGNOSIS — I16.0: ICD-10-CM

## 2020-06-30 DIAGNOSIS — D63.1: ICD-10-CM

## 2020-06-30 DIAGNOSIS — I12.0: ICD-10-CM

## 2020-06-30 DIAGNOSIS — Z79.01: ICD-10-CM

## 2020-06-30 DIAGNOSIS — E87.6: ICD-10-CM

## 2020-06-30 DIAGNOSIS — E78.5: ICD-10-CM

## 2020-06-30 LAB
ALBUMIN SERPL-MCNC: 2.4 G/DL (ref 3.5–5)
ALBUMIN/GLOB SERPL: 0.5 {RATIO} (ref 0.8–2)
ALP SERPL-CCNC: 123 IU/L (ref 40–150)
ALT SERPL-CCNC: 38 IU/L (ref 0–55)
ANION GAP SERPL CALC-SCNC: 14.5 MMOL/L (ref 8–16)
BASOPHILS # BLD AUTO: 0 10*3/UL (ref 0–0.1)
BASOPHILS NFR BLD AUTO: 0.5 % (ref 0–1)
BUN SERPL-MCNC: 26 MG/DL (ref 7–26)
BUN/CREAT SERPL: 11 (ref 6–25)
CALCIUM SERPL-MCNC: 8.6 MG/DL (ref 8.4–10.2)
CHLORIDE SERPL-SCNC: 104 MMOL/L (ref 98–107)
CK MB SERPL-MCNC: 2 NG/ML (ref 0–5)
CK SERPL-CCNC: 345 IU/L (ref 29–168)
CO2 SERPL-SCNC: 27 MMOL/L (ref 22–29)
DEPRECATED NEUTROPHILS # BLD AUTO: 2.2 10*3/UL (ref 2.1–6.9)
EGFRCR SERPLBLD CKD-EPI 2021: 26 ML/MIN (ref 60–?)
EOSINOPHIL # BLD AUTO: 0 10*3/UL (ref 0–0.4)
EOSINOPHIL NFR BLD AUTO: 0 % (ref 0–6)
ERYTHROCYTE [DISTWIDTH] IN CORD BLOOD: 17 % (ref 11.7–14.4)
GLOBULIN PLAS-MCNC: 4.7 G/DL (ref 2.3–3.5)
GLUCOSE SERPLBLD-MCNC: 117 MG/DL (ref 74–118)
HCT VFR BLD AUTO: 28.6 % (ref 34.2–44.1)
HGB BLD-MCNC: 9.8 G/DL (ref 12–16)
LYMPHOCYTES # BLD: 1.4 10*3/UL (ref 1–3.2)
LYMPHOCYTES NFR BLD AUTO: 35.4 % (ref 18–39.1)
MCH RBC QN AUTO: 28.2 PG (ref 28–32)
MCHC RBC AUTO-ENTMCNC: 34.3 G/DL (ref 31–35)
MCV RBC AUTO: 82.2 FL (ref 81–99)
MONOCYTES # BLD AUTO: 0.4 10*3/UL (ref 0.2–0.8)
MONOCYTES NFR BLD AUTO: 10.2 % (ref 4.4–11.3)
NEUTS SEG NFR BLD AUTO: 53.7 % (ref 38.7–80)
PLATELET # BLD AUTO: 142 X10E3/UL (ref 140–360)
POTASSIUM SERPL-SCNC: 2.5 MMOL/L (ref 3.5–5.1)
RBC # BLD AUTO: 3.48 X10E6/UL (ref 3.6–5.1)
SODIUM SERPL-SCNC: 143 MMOL/L (ref 136–145)

## 2020-06-30 PROCEDURE — 86705 HEP B CORE ANTIBODY IGM: CPT

## 2020-06-30 PROCEDURE — 71045 X-RAY EXAM CHEST 1 VIEW: CPT

## 2020-06-30 PROCEDURE — 97139 UNLISTED THERAPEUTIC PX: CPT

## 2020-06-30 PROCEDURE — 87340 HEPATITIS B SURFACE AG IA: CPT

## 2020-06-30 PROCEDURE — 86706 HEP B SURFACE ANTIBODY: CPT

## 2020-06-30 PROCEDURE — 87040 BLOOD CULTURE FOR BACTERIA: CPT

## 2020-06-30 PROCEDURE — 83880 ASSAY OF NATRIURETIC PEPTIDE: CPT

## 2020-06-30 PROCEDURE — 80048 BASIC METABOLIC PNL TOTAL CA: CPT

## 2020-06-30 PROCEDURE — 85025 COMPLETE CBC W/AUTO DIFF WBC: CPT

## 2020-06-30 PROCEDURE — 82550 ASSAY OF CK (CPK): CPT

## 2020-06-30 PROCEDURE — 80053 COMPREHEN METABOLIC PANEL: CPT

## 2020-06-30 PROCEDURE — 99285 EMERGENCY DEPT VISIT HI MDM: CPT

## 2020-06-30 PROCEDURE — 83605 ASSAY OF LACTIC ACID: CPT

## 2020-06-30 PROCEDURE — 36415 COLL VENOUS BLD VENIPUNCTURE: CPT

## 2020-06-30 PROCEDURE — 82553 CREATINE MB FRACTION: CPT

## 2020-06-30 PROCEDURE — 84484 ASSAY OF TROPONIN QUANT: CPT

## 2020-06-30 PROCEDURE — 87635 SARS-COV-2 COVID-19 AMP PRB: CPT

## 2020-06-30 RX ADMIN — METOPROLOL TARTRATE SCH MG: 25 TABLET, FILM COATED ORAL at 22:15

## 2020-06-30 RX ADMIN — Medication SCH MG: at 21:33

## 2020-06-30 RX ADMIN — APIXABAN SCH MG: 2.5 TABLET, FILM COATED ORAL at 21:33

## 2020-06-30 RX ADMIN — POTASSIUM CHLORIDE SCH MEQ: 1500 TABLET, EXTENDED RELEASE ORAL at 10:42

## 2020-06-30 RX ADMIN — HYDRALAZINE HYDROCHLORIDE PRN MG: 20 INJECTION INTRAMUSCULAR; INTRAVENOUS at 20:13

## 2020-06-30 NOTE — NUR
MOT and face sheet given to FORTUNATO Hartmann to attempt transfer to alternate 
facilities that have COVID bed availability.

## 2020-06-30 NOTE — EMERGENCY DEPARTMENT NOTE
History of Present Illnes


History of Present Illness


Chief Complaint:  COVID PUI


History of Present Illness


This is a 59 year old  [place] [facility] with noted low

oxygen saturation of 58% on RA per EMS .


Arrival Mode:  [place]


Onset (how long ago):  second(s)


Severity:  mild


Onset quality:  sudden


Duration (how long):  hour(s)


Timing of current episode:  constant


Progression:  worsening


Chronicity:  new


Relieving factors:  none


Exacerbating factors:  none


Associated symptoms:  Reports fever/chills, Reports weakness


Treatments prior to arrival:  none





Past Medical/Family History


Physician Review


I have reviewed the patient's past medical and family history.  Any updates have

been documented here.





Past Medical History


Recent Fever:  Yes


Clinical Suspicion of Infectio:  Yes


New/Unexplained Change in Ment:  No


Past Medical History:  Hypertension, A-Fib, ESRD, Hemodyalisis, Anemia, 

Hyperlipedemia, Chronic Kidney Disease


Other Medical History:  


PARKINSONS





Social History


Smoking Cessation:  Never Smoker


Alcohol Use:  None


Any Illegal Drug Use:  No





Review of Systems


Review of Systems


Constitutional:  Reports fever, Reports weakness


EENTM:  Reports no symptoms


Cardiovascular:  Reports no symptoms


Respiratory:  Reports no symptoms


Gastrointestinal:  Reports no symptoms


Genitourinary:  Reports no symptoms


Musculoskeletal:  Reports no symptoms


Integumentary:  Reports no symptoms


Neurological:  Reports no symptoms


Psychological:  Reports no symptoms


Endocrine:  Reports no symptoms


Hematological/Lymphatic:  Reports no symptoms





Physical Exam


Related Data


Allergies:  


Coded Allergies:  


     No Known Allergies (Unverified , 20)


Triage Vital Signs











  Date Time  Temp Pulse Resp B/P (MAP) Pulse Ox O2 Delivery O2 Flow Rate FiO2


 


20 15:42 98.8 54 20 172/86 97   








Vital signs reviewed:  Yes





Physical Exam


CONSTITUTIONAL





Constitutional:  Present morbidly obese, Present ill appearing


HENT


HENT:  Present normocephalic, Present atraumatic, Present oropharynx 

clear/moist, Present nose normal


HENT L/R:  Present left ext ear normal, Present right ext ear normal


EYES





Eyes:  Reports PERRL, Reports conjunctivae normal


NECK


Neck:  Present ROM normal


PULMONARY


Pulmonary:  Present effort normal, Present breath sounds normal


CARDIOVASCULAR





Cardiovascular:  Present regular rhythm, Present heart sounds normal, Present 

capillary refill normal, Present normal rate


GASTROINTESTINAL





Abdominal:  Present soft, Present nontender, Present bowel sounds normal


GENITOURINARY





Genitourinary:  Present exam deferred


SKIN


Skin:  Present warm, Present dry


MUSCULOSKELETAL





Musculoskeletal:  Present ROM normal


NEUROLOGICAL





Neurological:  Present alert, Present oriented x 3, Present no gross motor or 

sensory deficits


PSYCHOLOGICAL


Psychological:  Present mood/affect normal, Present judgement normal





Results


Laboratory


Lab results reviewed:  Yes


Laboratory comments





Laboratory Tests








Test


 20


09:42 20


07:59


 


Venous Blood pH


 7.460


(7.35-7.38) 





 


Venous Blood Partial Pressure


CO2 41.8 (44-48) 


 





 


Venous Blood Partial Pressure


O2 65 (40-41) 


 





 


Venous Blood HCO3 29.7 (21-22)  


 


Venous Blood Total Carbon


Dioxide 31 


 





 


Venous Blood Oxygen Saturation 93  


 


Venous Blood Base Excess 6  


 


FiO2  %  


 


White Blood Count


 


 4.01 x10e3/uL


(4.8-10.8)


 


Red Blood Count


 


 3.48 x10e6/uL


(3.6-5.1)


 


Hemoglobin


 


 9.8 g/dL


(12.0-16.0)


 


Hematocrit


 


 28.6 %


(34.2-44.1)


 


Mean Corpuscular Volume


 


 82.2 fL


(81-99)


 


Mean Corpuscular Hemoglobin


 


 28.2 pg


(28-32)


 


Mean Corpuscular Hemoglobin


Concent 


 34.3 g/dL


(31-35)


 


Red Cell Distribution Width


 


 17.0 %


(11.7-14.4)


 


Platelet Count


 


 142 x10e3/uL


(140-360)


 


Neutrophils (%) (Auto)


 


 53.7 %


(38.7-80.0)


 


Lymphocytes (%) (Auto)


 


 35.4 %


(18.0-39.1)


 


Monocytes (%) (Auto)


 


 10.2  %


(4.4-11.3)


 


Eosinophils (%) (Auto)


 


 0.0 %


(0.0-6.0)


 


Basophils (%) (Auto)


 


 0.5 %


(0.0-1.0)


 


Neutrophils # (Auto)  2.2 (2.1-6.9) 


 


Lymphocytes # (Auto)  1.4 (1.0-3.2) 


 


Monocytes # (Auto)  0.4 (0.2-0.8) 


 


Eosinophils # (Auto)  0.0 (0.0-0.4) 


 


Basophils # (Auto)  0.0 (0.0-0.1) 


 


Absolute Immature Granulocyte


(auto 


 0.01 x10e3/uL


(0-0.1)


 


Sodium Level


 


 143 mmol/L


(136-145)


 


Potassium Level


 


 2.5 mmol/L


(3.5-5.1)


 


Chloride Level


 


 104 mmol/L


()


 


Carbon Dioxide Level


 


 27 mmol/L


(22-29)


 


Anion Gap


 


 14.5 mmol/L


(8-16)


 


Blood Urea Nitrogen


 


 26 mg/dL


(7-26)


 


Creatinine


 


 2.31 mg/dL


(0.57-1.11)


 


Estimat Glomerular Filtration


Rate 


 26 ML/MIN


(60-)


 


BUN/Creatinine Ratio  11 (6-25) 


 


Glucose Level


 


 117 mg/dL


()


 


Lactic Acid Level


 


 1.7 mmol/L


(0.5-2.0)


 


Calcium Level


 


 8.6 mg/dL


(8.4-10.2)


 


Total Bilirubin


 


 2.0 mg/dL


(0.2-1.2)


 


Aspartate Amino Transf


(AST/SGOT) 


 136 IU/L


(5-34)


 


Alanine Aminotransferase


(ALT/SGPT) 


 38 IU/L (0-55) 





 


Alkaline Phosphatase


 


 123 IU/L


()


 


Creatine Kinase


 


 345 IU/L


()


 


Creatine Kinase MB


 


 2.00 ng/mL


(0-5.0)


 


Troponin I


 


 0.260 ng/mL


(0-0.300)


 


Total Protein


 


 7.1 g/dL


(6.5-8.1)


 


Albumin


 


 2.4 g/dL


(3.5-5.0)


 


Globulin


 


 4.7 g/dL


(2.3-3.5)


 


Albumin/Globulin Ratio  0.5 (0.8-2.0) 


 


Coronavirus (PCR)


 


 Detected


(NOTDETECTED)











Imaging


Imaging results reviewed:  Yes


Impressions


                                        


                        [place] [id number]








Patient Name: JOSHUA BORDEN                          MR #: [id number]           


  


: 1960                         Age/Sex: 59/F


Acct #: [id number]                    Req #: [id number]


Adm Physician:                                      


Ordered by: JERMAINE CLEMENTE DO                    Report #: [id number] 


Location: ER                            Room/Bed:           


                                             ___________________________________


________________________________________________________________





Procedure: [id number] DX/CHEST SINGLE (PORTABLE)


Exam Date: 20                            Exam Time: 1030








                              REPORT STATUS: Signed





EXAM:  CHEST SINGLE (PORTABLE)





DATE: 2020 10:30 AM  





INDICATION: Hypoxemia  





COMPARISON: 2020





FINDINGS:


Right IJ tunneled dialysis catheter identified in stable position.





The trachea is midline. There are increased patchy airspace opacities


identified within throughout the lungs bilaterally. There is no evidence for


lobar consolidation, pneumothorax, or significant.





The cardiomediastinal silhouette is stable in appearance. No acute osseous


abnormality is identified.








IMPRESSION:





Patchy increased airspace opacities identified bilaterally which are


nonspecific but can be seen in the setting of an atypical/viral pneumonitis.





Signed by: Dr. Jhoan Beckett MD on 2020 11:10 AM








Dictated By: JHOAN BECKETT MD


Electronically Signed By: JHOAN BECKETT MD on 20 1110


Transcribed By: JOAQUIM on 20 1110 








COPY TO:   JERMAINE CLEMENTE DO~





Critical Care Time


Total Critical Care Time (min):  31


Critcal care necessary due to:  respiratory failure


Critcal care time spent by me:  develop tx plan w patient/surrogate, discussion 

w consultants, discussion w primary provider, examination of patient, obtaining 

hx from patient/surrogate, order/perform tx or interventions, order/review 

laboratory studies, pulse oximetry, re-evaluation of patient condition





Assessment & Plan


Medical Decision Making


MDM


59 yof presents with new onset of hypoxia. Diff dx: CHF, ACS, PE, COVID-19 

infection. Patient (+) for COVID-19. Plan to admit for continous pulse ox 

monitoring





Assessment & Plan


Final Impression:  


(1) Upper respiratory tract infection due to COVID-19 virus


(2) Hypoxia


(3) Renal failure


(4) Hypokalemia


(5) Hypertensive urgency


Depart Disposition:  ADMITTED


Home Meds


Reported Medications


Cholecalciferol (Vitamin D3) (Vitamin D3) 5,000 Unit Tab.rapdis, 5000 UNIT PO HS


   20


Spironolactone (SPIRONOLACTONE) 25 Mg Tablet, 25 MG PO BID, #60 TAB


   20


Carbidopa/Levodopa (Rytary ER 23.75 mg-95 mg Cap) 1 Each Capsule.er, 1 CAP PO 

Q6H


   20


Potassium Chloride (POTASSIUM CHLORIDE) 20 Meq Tab.er.prt, 20 MG PO Q12H


   20


Polyethylene Glycol 3350 (POLYETHYLENE GLYCOL 3350) 17 Gm Powd.pack, 17 GM PO 

DAILY, PACKET


   20


Ondansetron Hcl (ONDANSETRON HCL) 2 Mg/1 Ml Vial, 4 MG PO Q4HR for NAUSEA, VIAL


   20


Metoprolol Tartrate (METOPROLOL TARTRATE) 25 Mg Tablet, 25 MG PO Q12H, TAB


   20


Losartan Potassium (LOSARTAN POTASSIUM) 100 Mg Tablet, 100 MG PO DAILY, TAB


   20


Apixaban (Eliquis) 5 Mg Tablet, 5 MG PO Q12H


   20


Docusate Sodium (DOCUSATE SODIUM) 100 Mg Capsule, 100 MG PO Q12H, CAP


   20


Clonidine Hcl (CLONIDINE HCL) 0.1 Mg Tablet, 1 TAB PO Q8H, #60 TAB


   20


Atorvastatin Calcium (ATORVASTATIN CALCIUM) 20 Mg Tablet, 80 MG PO HS, #30 TAB


   20


Aspirin (ASPIR 81) 81 Mg Tablet.dr, 1 MG PO DAILY


   20


Medications in the ED





Potassium Chloride 100 ml @  100 mls/hr ONCE  ONCE IV  Last administered on 

20at 10:42; Admin Dose 100 MLS/HR;  Start 20 at 09:30;  Stop 20 

at 10:29;  Status DC


Sodium Chloride 250 ml @  STK-MED ONCE .ROUTE ;  Start 20 at 10:14;  Stop

20 at 10:09;  Status DC


Hydralazine HCl 10 mg NOW IV  Last administered on 20at 13:00; Admin Dose 

10 MG;  Start 20 at 12:45;  Stop 20 at 13:59;  Status DC


Aspirin 81 mg PRN  ONCE PO ;  Start 20 at 17:45;  Stop 20 at 18:21;  

Status DC











JERMAINE CLEMENTE DO                2020 07:48

## 2020-06-30 NOTE — DIAGNOSTIC IMAGING REPORT
EXAM:  CHEST SINGLE (PORTABLE)



DATE: 6/30/2020 10:30 AM  



INDICATION: Hypoxemia  



COMPARISON: 4/18/2020



FINDINGS:

Right IJ tunneled dialysis catheter identified in stable position.



The trachea is midline. There are increased patchy airspace opacities

identified within throughout the lungs bilaterally. There is no evidence for

lobar consolidation, pneumothorax, or significant.



The cardiomediastinal silhouette is stable in appearance. No acute osseous

abnormality is identified.





IMPRESSION:



Patchy increased airspace opacities identified bilaterally which are

nonspecific but can be seen in the setting of an atypical/viral pneumonitis.



Signed by: Dr. Jhoan Browne MD on 6/30/2020 11:10 AM

## 2020-07-01 VITALS — DIASTOLIC BLOOD PRESSURE: 89 MMHG | SYSTOLIC BLOOD PRESSURE: 160 MMHG

## 2020-07-01 VITALS — SYSTOLIC BLOOD PRESSURE: 188 MMHG | DIASTOLIC BLOOD PRESSURE: 89 MMHG

## 2020-07-01 VITALS — DIASTOLIC BLOOD PRESSURE: 92 MMHG | SYSTOLIC BLOOD PRESSURE: 173 MMHG

## 2020-07-01 VITALS — DIASTOLIC BLOOD PRESSURE: 96 MMHG | SYSTOLIC BLOOD PRESSURE: 207 MMHG

## 2020-07-01 VITALS — DIASTOLIC BLOOD PRESSURE: 81 MMHG | SYSTOLIC BLOOD PRESSURE: 170 MMHG

## 2020-07-01 VITALS — SYSTOLIC BLOOD PRESSURE: 189 MMHG | DIASTOLIC BLOOD PRESSURE: 93 MMHG

## 2020-07-01 VITALS — SYSTOLIC BLOOD PRESSURE: 167 MMHG | DIASTOLIC BLOOD PRESSURE: 81 MMHG

## 2020-07-01 LAB
ALBUMIN SERPL-MCNC: 2.4 G/DL (ref 3.5–5)
ALBUMIN/GLOB SERPL: 0.5 {RATIO} (ref 0.8–2)
ALP SERPL-CCNC: 116 IU/L (ref 40–150)
ALT SERPL-CCNC: 66 IU/L (ref 0–55)
ANION GAP SERPL CALC-SCNC: 14.9 MMOL/L (ref 8–16)
BASOPHILS # BLD AUTO: 0 10*3/UL (ref 0–0.1)
BASOPHILS NFR BLD AUTO: 0.2 % (ref 0–1)
BUN SERPL-MCNC: 32 MG/DL (ref 7–26)
BUN/CREAT SERPL: 14 (ref 6–25)
CALCIUM SERPL-MCNC: 8.5 MG/DL (ref 8.4–10.2)
CHLORIDE SERPL-SCNC: 108 MMOL/L (ref 98–107)
CK MB SERPL-MCNC: 2.1 NG/ML (ref 0–5)
CK SERPL-CCNC: 279 IU/L (ref 29–168)
CO2 SERPL-SCNC: 25 MMOL/L (ref 22–29)
DEPRECATED NEUTROPHILS # BLD AUTO: 4.2 10*3/UL (ref 2.1–6.9)
EGFRCR SERPLBLD CKD-EPI 2021: 27 ML/MIN (ref 60–?)
EOSINOPHIL # BLD AUTO: 0 10*3/UL (ref 0–0.4)
EOSINOPHIL NFR BLD AUTO: 0 % (ref 0–6)
ERYTHROCYTE [DISTWIDTH] IN CORD BLOOD: 16.8 % (ref 11.7–14.4)
GLOBULIN PLAS-MCNC: 4.8 G/DL (ref 2.3–3.5)
GLUCOSE SERPLBLD-MCNC: 107 MG/DL (ref 74–118)
HCT VFR BLD AUTO: 30 % (ref 34.2–44.1)
HGB BLD-MCNC: 10.6 G/DL (ref 12–16)
LYMPHOCYTES # BLD: 1.6 10*3/UL (ref 1–3.2)
LYMPHOCYTES NFR BLD AUTO: 24.8 % (ref 18–39.1)
MCH RBC QN AUTO: 28.6 PG (ref 28–32)
MCHC RBC AUTO-ENTMCNC: 35.3 G/DL (ref 31–35)
MCV RBC AUTO: 81.1 FL (ref 81–99)
MONOCYTES # BLD AUTO: 0.6 10*3/UL (ref 0.2–0.8)
MONOCYTES NFR BLD AUTO: 9.8 % (ref 4.4–11.3)
NEUTS SEG NFR BLD AUTO: 64.9 % (ref 38.7–80)
PLATELET # BLD AUTO: 141 X10E3/UL (ref 140–360)
POTASSIUM SERPL-SCNC: 2.9 MMOL/L (ref 3.5–5.1)
RBC # BLD AUTO: 3.7 X10E6/UL (ref 3.6–5.1)
SODIUM SERPL-SCNC: 145 MMOL/L (ref 136–145)

## 2020-07-01 PROCEDURE — 5A1D70Z PERFORMANCE OF URINARY FILTRATION, INTERMITTENT, LESS THAN 6 HOURS PER DAY: ICD-10-PCS

## 2020-07-01 RX ADMIN — LINEZOLID SCH MG: 600 TABLET, FILM COATED ORAL at 17:11

## 2020-07-01 RX ADMIN — DOCUSATE SODIUM SCH MG: 100 TABLET, FILM COATED ORAL at 21:01

## 2020-07-01 RX ADMIN — METOPROLOL TARTRATE SCH MG: 25 TABLET, FILM COATED ORAL at 23:40

## 2020-07-01 RX ADMIN — HYDRALAZINE HYDROCHLORIDE PRN MG: 20 INJECTION INTRAMUSCULAR; INTRAVENOUS at 17:12

## 2020-07-01 RX ADMIN — POTASSIUM CHLORIDE SCH MEQ: 1500 TABLET, EXTENDED RELEASE ORAL at 12:11

## 2020-07-01 RX ADMIN — APIXABAN SCH MG: 2.5 TABLET, FILM COATED ORAL at 21:01

## 2020-07-01 RX ADMIN — APIXABAN SCH MG: 2.5 TABLET, FILM COATED ORAL at 08:28

## 2020-07-01 RX ADMIN — DOCUSATE SODIUM SCH MG: 100 TABLET, FILM COATED ORAL at 08:29

## 2020-07-01 RX ADMIN — LOSARTAN POTASSIUM SCH MG: 100 TABLET, FILM COATED ORAL at 08:43

## 2020-07-01 RX ADMIN — SODIUM CHLORIDE SCH MG: 900 INJECTION INTRAVENOUS at 17:11

## 2020-07-01 RX ADMIN — DOCUSATE SODIUM SCH MG: 100 TABLET, FILM COATED ORAL at 08:28

## 2020-07-01 RX ADMIN — ASPIRIN 81 MG CHEWABLE TABLET SCH MG: 81 TABLET CHEWABLE at 08:28

## 2020-07-01 RX ADMIN — CEFEPIME HYDROCHLORIDE SCH MLS/HR: 1 INJECTION, POWDER, FOR SOLUTION INTRAMUSCULAR; INTRAVENOUS at 17:11

## 2020-07-01 RX ADMIN — HYDRALAZINE HYDROCHLORIDE PRN MG: 20 INJECTION INTRAMUSCULAR; INTRAVENOUS at 01:00

## 2020-07-01 RX ADMIN — Medication SCH MG: at 21:01

## 2020-07-01 RX ADMIN — METOPROLOL TARTRATE SCH MG: 25 TABLET, FILM COATED ORAL at 08:43

## 2020-07-01 NOTE — DIAGNOSTIC IMAGING REPORT
EXAMINATION:  CHEST SINGLE (PORTABLE)    



INDICATION:      

^viral pneumonia

^[id number]

^0555.    



COMPARISON:  6/30/2020

     

FINDINGS:  AP view   



TUBES and LINES:  Right IJ dialysis catheter tip terminates at the cavoatrial

junction.



LUNGS:  Redemonstration of multifocal consolidative opacities with increased

dense consolidation in the right upper lobe.  



PLEURA:  No pleural effusion or pneumothorax.



HEART AND MEDIASTINUM:  The cardiomediastinal silhouette is unremarkable.    



BONES AND SOFT TISSUES:  No acute osseous lesion.  Soft tissues are

unremarkable.



UPPER ABDOMEN: No free air under the diaphragm.    



IMPRESSION: 

Redemonstration of multifocal pneumonia with increased dense consolidation in

the right upper lobe





Signed by: Christ Zayas MD on 7/1/2020 8:35 AM

## 2020-07-01 NOTE — CONSULTATION
DATE OF CONSULTATION:  

 

Pulmonary Critical Care Consultation 

 

CHIEF COMPLAINT:  Malaise, renal failure, and positive COVID test.

 

HISTORY OF PRESENT ILLNESS:  The patient is a 59-year-old woman.  She has a history of

stroke in early 2020.  This affected her memory.  She also has a history of Parkinson

disease, atrial fibrillation, hypertension, and end-stage renal disease.  She required

hospitalization at [facility] in late April of 2020 with GI

bleeding.  She required a transfusion with packed red blood cells.  Eventual endoscopy

showed an AVM, that was cauterized. 

 

She now comes [facility] with some malaise.  She had a positive COVID test.

She denies fevers, cough, or chest pain.  She is not having nausea or vomiting. 

 

PAST SURGICAL HISTORY:  

1. Status post cholecystectomy.

2. Status post dialysis access placement.

 

PAST MEDICAL HISTORY:  

1. End-stage renal disease.

2. Parkinson disease.

3. Paroxysmal atrial fibrillation.

4. Hypertension.

5. Cerebrovascular accident earlier this year.

 

SOCIAL HISTORY:  The patient is not a smoker or drinker.  [facility]. 

 

ALLERGIES:  NO KNOWN DRUG ALLERGIES.

 

FAMILY HISTORY:  There is a history of heart disease and renal disease in the family.

 

REVIEW OF SYSTEMS:

The patient denies any headache.  She has no fevers.  She has no dyspnea.  She has mild

cough.  No chest pain.  She has no nausea or vomiting.  She has no leg edema. 

 

PHYSICAL EXAMINATION:

VITAL SIGNS:  The patient is afebrile.  Blood pressure is 172/86, saturation is 97%, and

pulse is 54. 

HEENT:  Shows no facial swelling or erythema. 

LYMPHATIC:  Shows no submandibular, cervical, or supraclavicular adenopathy. 

CARDIAC:  Reveals regular rate and rhythm.  Normal S1 and S2. 

LUNGS:  Auscultation of lungs shows clear breath sounds bilaterally.  There is no

wheezing. 

ABDOMEN:  Soft and nontender.  There is no rebound or guarding. 

EXTREMITIES:  Show no leg edema or calf tenderness.  There is no cyanosis or clubbing. 

SKIN:  Shows no rashes.

LABORATORY DATA:  BUN to creatinine ratio is 26 and 2.31 with a potassium of 2.5.  Other

electrolytes are within normal limits.  AST is 136.  Total bilirubin is 2.0.  Albumin is

2.4.  White blood cell count is 4.01, hemoglobin is 9.8, and platelet count is 142. 

 

RADIOGRAPHIC DATA:  Chest x-ray shows patchy airspace disease bilaterally, suggestive of

viral pneumonia. 

 

IMPRESSION:  

1. Coronavirus disease 2019 infection and viral pneumonia.

2. End-stage renal disease.

3. Atrial fibrillation.

4. Prior stroke with memory loss.

5. Hypokalemia.

6. Hypertension.

 

PLAN:  

1. Observation in the COVID unit.

2. Oxygen as needed.

3. Tylenol as needed for fever.

4. The patient to continue receiving dialysis.

5. Continue anticoagulation with Eliquis.

6. Hold dexamethasone for now because the patient is not having dyspnea or poor oxygen

saturations. 

 

 

 

 

______________________________

Wing Goodrich MD

 

[facility]

D:  06/30/2020 19:25:24

T:  07/01/2020 01:14:59

Job #:  [id number]

## 2020-07-01 NOTE — NUR
REPORT GIVEN TO DAYSHIFT NURSE. ALERT AND ORIENTED. NO SIGNS IV INFILTRATION. BED LOCKED AND 
IN LOW POSITION. CALL LIGHT WITHIN REACH. BED ALARM ACTIVATED.

## 2020-07-01 NOTE — PROGRESS NOTE
DATE:    

 

SUBJECTIVE:  The patient complains of some cough.  She has no fevers.  She is not having

chest pain. 

 

PHYSICAL EXAMINATION:

VITAL SIGNS:  The blood pressure is 173/93, saturation is 100% and the pulse is 58. 

HEENT:  No facial swelling or erythema. 

CARDIAC:  Regular rate and rhythm with normal S1, S2. 

LUNGS:  Auscultation of lungs reveals clear breath sounds bilaterally.  There is no

wheezing. 

ABDOMEN:  Soft, nontender.  There is no rebound or guarding. 

EXTREMITIES:  No leg edema or calf tenderness.  There is no cyanosis or clubbing. 

SKIN:  No rashes.

LABORATORY DATA:  Potassium is 2.9.  The BUN to creatinine ratio is 32 to 2.24.  Total

bilirubin is 2.2 and the AST is 123.  Albumin is 2.4. 

 

IMPRESSION:  

1. Viral pneumonia and coronavirus disease-19 infection.

2. End-stage renal disease.

3. Atrial fibrillation.

4. Prior stroke with memory loss.

5. Hypokalemia.

 

PLAN:  

1. Continue dialysis as needed.

2. Replace potassium.

3. Azithromycin and Rocephin.

4. Anticoagulation.

5. Cough suppressant.

 

 

 

 

______________________________

Wing Goodrich MD

 

[facility]

D:  07/01/2020 15:38:31

T:  07/01/2020 17:04:58

Job #:  [id number]

## 2020-07-01 NOTE — CONSULTATION
DATE OF CONSULTATION:    

 

REASON FOR CONSULTATION:  Concern about COVID-19.

 

HISTORY OF PRESENT ILLNESS:  This patient is a 59-year-old, who has history of stroke.

She was at [facility].  The patient has been not feeling well for a few days.

COVID-19 came back positive.  The patient is just weak in general.  She has history of

CVA, history of Parkinson's, atrial fibrillation, hypertension.  She was here in April.

She had GI bleed and she received blood.  She had AVM malformation.  She is coming from

[facility] with malaise.  Her COVID-19 came back positive.  The patient has no

specific complaint, just fatigue.  She is really not a good source of information, so

history was taken mainly from the chart.  She does have history of cholecystectomy, IV

access for dialysis. 

 

ALLERGIES:  NKA.

 

SOCIAL HISTORY:  There is no smoking, drug abuse, or alcohol abuse.

 

FAMILY HISTORY:  Otherwise noncontributory.

 

PAST MEDICAL HISTORY:  The patient has history of end-stage disease, Parkinson disease,

atrial fibrillation, hypertension, and CVA.__________ 

 

LABORATORY DATA:  Blood cultures were ordered.  White count 6.5, hemoglobin 10,

hematocrit 30.  Her sodium 145, potassium 2.9, creatinine 2.24. 

 

MEDICATIONS:  She is on potassium, metoprolol, Lopressor, Colace, Eliquis.

 

PHYSICAL EXAMINATION:

GENERAL:  She is currently alert and oriented. 

VITAL SIGNS:  Stable, currently afebrile.  O2 saturation on nasal cannula is 94. 

HEENT:  She is not icteric. 

NECK:  Supple. 

CHEST:  Crackles bilaterally. 

HEART:  S1 and S2. 

ABDOMEN:  Soft.

IMPRESSION:  

1. Coronavirus disease 2019, pneumonia.  We will put her on Rocephin.  The patient is

coming from nursing home.  We will put her on cefepime 1 g a day and Zyvox 600 IV q.12.

We will start her on Decadron. 

2. Chronic kidney disease.

3. She is already on Eliquis.

4. The patient looks comfortable and stable.  She will be arranged for discharge to

skilled care 

facility.  Her Zyvox will be changed to oral to finish 5 days and the Decadron could be

given oral for 10 days. 

 

 

 

 

______________________________

MD VICTORINO Reed/CHERYL

D:  07/01/2020 15:53:57

T:  07/01/2020 22:24:33

Job #:  [id number]

## 2020-07-02 VITALS — SYSTOLIC BLOOD PRESSURE: 175 MMHG | DIASTOLIC BLOOD PRESSURE: 85 MMHG

## 2020-07-02 VITALS — SYSTOLIC BLOOD PRESSURE: 151 MMHG | DIASTOLIC BLOOD PRESSURE: 96 MMHG

## 2020-07-02 VITALS — SYSTOLIC BLOOD PRESSURE: 159 MMHG | DIASTOLIC BLOOD PRESSURE: 80 MMHG

## 2020-07-02 VITALS — DIASTOLIC BLOOD PRESSURE: 96 MMHG | SYSTOLIC BLOOD PRESSURE: 172 MMHG

## 2020-07-02 VITALS — SYSTOLIC BLOOD PRESSURE: 172 MMHG | DIASTOLIC BLOOD PRESSURE: 96 MMHG

## 2020-07-02 VITALS — DIASTOLIC BLOOD PRESSURE: 85 MMHG | SYSTOLIC BLOOD PRESSURE: 175 MMHG

## 2020-07-02 VITALS — DIASTOLIC BLOOD PRESSURE: 80 MMHG | SYSTOLIC BLOOD PRESSURE: 146 MMHG

## 2020-07-02 VITALS — SYSTOLIC BLOOD PRESSURE: 152 MMHG | DIASTOLIC BLOOD PRESSURE: 82 MMHG

## 2020-07-02 LAB
ALBUMIN SERPL-MCNC: 2.4 G/DL (ref 3.5–5)
ALBUMIN/GLOB SERPL: 0.4 {RATIO} (ref 0.8–2)
ALP SERPL-CCNC: 113 IU/L (ref 40–150)
ALT SERPL-CCNC: 73 IU/L (ref 0–55)
ANION GAP SERPL CALC-SCNC: 12.2 MMOL/L (ref 8–16)
BUN SERPL-MCNC: 21 MG/DL (ref 7–26)
BUN/CREAT SERPL: 11 (ref 6–25)
CALCIUM SERPL-MCNC: 8.5 MG/DL (ref 8.4–10.2)
CHLORIDE SERPL-SCNC: 104 MMOL/L (ref 98–107)
CO2 SERPL-SCNC: 28 MMOL/L (ref 22–29)
EGFRCR SERPLBLD CKD-EPI 2021: 32 ML/MIN (ref 60–?)
GLOBULIN PLAS-MCNC: 5.4 G/DL (ref 2.3–3.5)
GLUCOSE SERPLBLD-MCNC: 148 MG/DL (ref 74–118)
POTASSIUM SERPL-SCNC: 4.2 MMOL/L (ref 3.5–5.1)
SODIUM SERPL-SCNC: 140 MMOL/L (ref 136–145)

## 2020-07-02 RX ADMIN — LINEZOLID SCH MG: 600 TABLET, FILM COATED ORAL at 21:21

## 2020-07-02 RX ADMIN — APIXABAN SCH MG: 2.5 TABLET, FILM COATED ORAL at 08:09

## 2020-07-02 RX ADMIN — METOPROLOL TARTRATE SCH MG: 25 TABLET, FILM COATED ORAL at 08:09

## 2020-07-02 RX ADMIN — LOSARTAN POTASSIUM SCH MG: 100 TABLET, FILM COATED ORAL at 08:09

## 2020-07-02 RX ADMIN — CEFEPIME HYDROCHLORIDE SCH MLS/HR: 1 INJECTION, POWDER, FOR SOLUTION INTRAMUSCULAR; INTRAVENOUS at 17:43

## 2020-07-02 RX ADMIN — SODIUM CHLORIDE SCH MG: 900 INJECTION INTRAVENOUS at 17:43

## 2020-07-02 RX ADMIN — APIXABAN SCH MG: 2.5 TABLET, FILM COATED ORAL at 21:21

## 2020-07-02 RX ADMIN — METOPROLOL TARTRATE SCH MG: 25 TABLET, FILM COATED ORAL at 21:22

## 2020-07-02 RX ADMIN — Medication SCH MG: at 21:21

## 2020-07-02 RX ADMIN — ASPIRIN 81 MG CHEWABLE TABLET SCH MG: 81 TABLET CHEWABLE at 08:09

## 2020-07-02 RX ADMIN — LINEZOLID SCH MG: 600 TABLET, FILM COATED ORAL at 08:09

## 2020-07-02 RX ADMIN — DOCUSATE SODIUM SCH MG: 100 TABLET, FILM COATED ORAL at 21:30

## 2020-07-02 RX ADMIN — DOCUSATE SODIUM SCH MG: 100 TABLET, FILM COATED ORAL at 08:08

## 2020-07-02 NOTE — CONSULTATION
DATE OF CONSULTATION:  07/01/2020  

 

HISTORY OF PRESENT ILLNESS:  This is a 59-year-old female, underlying history of

end-stage renal disease, has a dialysis catheter, admitted from [facility] with

shortness of breath, found to have COVID pneumonia, which is multifocal, currently in

isolation.  Renal consulted for management of kidney failure.  She has past history of

atrial fibrillation, hypertension, end-stage renal disease, prior history of CVA,

Parkinson disease.  She interestingly denies any cough, shortness of breath.  She is

awake, on oxygen.  She has had prior cholecystectomy.  She has had a prior CVA.  She

does not appear to be in a respiratory distress. 

 

ALLERGIES:  SHE HAS NO DRUG ALLERGIES.

 

CURRENT MEDICATIONS:  The patient was given potassium chloride tablets, she is on a

scheduled dose, which I am going to stop.  She is on losartan 25 mg p.o. q.12,

hydralazine p.r.n., guaifenesin, Docusate, dexamethasone, atorvastatin, aspirin,

apixaban, Eliquis 2.5 q.12.  She is on azithromycin, cefepime.  Azithromycin has been

stopped. 

 

Dr. Mc and Dr. James Phillips are following.

 

SOCIAL HISTORY:  Does not smoke or drink.

 

FAMILY HISTORY:  Negative for kidney disease.

 

LABORATORY TESTS:  White count of 6.5, hemoglobin 10.6, potassium 2.9, which has been

replaced, bicarb 25, creatinine 2.24. 

 

.

 

PHYSICAL EXAMINATION:

GENERAL:  Awake, alert, lying supine, in no apparent distress. 

VITAL SIGNS:  Blood pressure 186/93, pulse rate 64 irregularly irregular, respiratory

rate 19. 

HEAD AND NECK:  Cornea clear. 

LUNGS:  Bilateral rales, scattered rhonchi.  Harsh vesicular breath sounds. 

HEART: Irregular rhythm. 

ABDOMEN:  Otherwise soft, nontender.  No apparent visceromegaly. 

EXTREMITIES:  Lower extremities, no edema.

IMPRESSION:  

1. End-stage renal disease.

2. Coronavirus disease positive.

3. Multifocal pneumonia.

4. Hypokalemia, replaced.

 

PLAN:  On dialysis for 3 hours with sodium 140, potassium 4, bicarb 35, calcium 2.5.  UF

2-3 L as tolerated.  We will obtain a BNP level tomorrow as well as chemistries.  Strict

I's and O's.  Please see orders. 

 

 

 

 

______________________________

MD ADA Martinez/CHERYL

D:  07/01/2020 17:31:38

T:  07/02/2020 00:05:40

Job #:  [id number]

## 2020-07-02 NOTE — PROGRESS NOTE
DATE:    

 

SUBJECTIVE:  The patient is seen and evaluated.  Discussed with Dr. Mc in detail.

 

REVIEW OF SYSTEMS:

Doing great.  No nausea, vomiting, fever, chills, chest pain, shortness of breath,

headache, rash, dysuria, polyuria, loss of taste, loss of smell, none of the above. 

 

PHYSICAL EXAMINATION:

VITAL SIGNS:  The patient is at room air saturating at 99%.  Temperature 98.2, pulse 56,

respirations 16, and blood pressure 172/96. 

GENERAL:  Alert and oriented.  No acute distress.  Off oxygen on room air. 

CV: S1 and S2. 

CHEST:  Equal expansion. Decreased breath sounds. No acute distress. 

ABDOMEN:  Soft and nontender.  No distention. 

HEENT:  Moist.  No pallor.  No JVD. 

EXTREMITIES:  Moves all.  Trace edema.

MEDICATIONS:  Medication list reviewed.  The patient is on Zyvox, cefepime,

dexamethasone, and Eliquis. 

 

LABORATORY STUDIES:  White count of 6.5, hemoglobin 10.6, and platelet 141.  Sodium 140,

potassium 4.2, and creatinine 1.94.  The patient is on dialysis.  Coronavirus PCR

detected on 06/30. 

 

MICROBIOLOGY:  Blood culture, negative 48 hours from 06/30.

 

IMAGING:  Chest x-ray from yesterday showed re-demonstration of multifocal pneumonia

with increased dense consolidation in the right upper lobe. 

 

ASSESSMENT AND PLAN:  

1. COVID-19 infection.

2. Chronic kidney disease.

3. Zyvox for a total of 5 days.  Continue with dexamethasone.  Also on cefepime, on room

air.  Discussed with case management.  Discharge planning in progress to skilled nursing

facility.  Please refer to chart for more information.  Discussed with Dr. Mc in

details. 

 

 

Dictated by Alo Graham PA-C (Al)

 

______________________________

Phan Mc MD

 

AS/MODL

D:  07/02/2020 11:13:19

T:  07/02/2020 11:42:28

Job #:  [id number]

## 2020-07-02 NOTE — NUR
SPOKE WITH PT CONCERNING THE SNF PLACEMENT IN [place], AGREED VERBALLY WILL FAX TO 
FACILITY, COMPLETED [facility] RTF AND COVID FORM.

## 2020-07-02 NOTE — PROGRESS NOTE
DATE:    

 

SUBJECTIVE:  The patient is not complaining of cough or dyspnea.

 

PHYSICAL EXAMINATION:

VITAL SIGNS:  Blood pressure is 152/82, saturation is 98%, and the pulse is 74. 

HEENT:  Shows no facial swelling or erythema. 

CARDIAC:  Reveals regular rate and rhythm with normal S1 and S2. 

LUNGS:  Auscultation of lungs reveals clear breath sounds bilaterally.  There is no

wheezing. 

ABDOMEN:  Soft and nontender.  There is no rebound or guarding. 

EXTREMITIES:  Shows no leg edema or calf tenderness.  There is no cyanosis or clubbing. 

SKIN:  Shows no rashes.

IMPRESSION:  

1. Viral pneumonia and COVID-19 infection.

2. End-stage renal disease.

3. Atrial fibrillation.

 

PLAN:  

1. Arrange disposition.

2. Continue dialysis.

3. Complete antibiotics.

 

 

 

 

______________________________

MD CHARLES Purdy/MODL

D:  07/02/2020 11:19:41

T:  07/02/2020 13:01:05

Job #:  [id number]

## 2020-07-03 VITALS — DIASTOLIC BLOOD PRESSURE: 85 MMHG | SYSTOLIC BLOOD PRESSURE: 168 MMHG

## 2020-07-03 VITALS — DIASTOLIC BLOOD PRESSURE: 80 MMHG | SYSTOLIC BLOOD PRESSURE: 155 MMHG

## 2020-07-03 VITALS — SYSTOLIC BLOOD PRESSURE: 155 MMHG | DIASTOLIC BLOOD PRESSURE: 80 MMHG

## 2020-07-03 VITALS — DIASTOLIC BLOOD PRESSURE: 80 MMHG | SYSTOLIC BLOOD PRESSURE: 167 MMHG

## 2020-07-03 VITALS — SYSTOLIC BLOOD PRESSURE: 166 MMHG | DIASTOLIC BLOOD PRESSURE: 82 MMHG

## 2020-07-03 VITALS — DIASTOLIC BLOOD PRESSURE: 95 MMHG | SYSTOLIC BLOOD PRESSURE: 186 MMHG

## 2020-07-03 VITALS — DIASTOLIC BLOOD PRESSURE: 79 MMHG | SYSTOLIC BLOOD PRESSURE: 118 MMHG

## 2020-07-03 LAB
ANION GAP SERPL CALC-SCNC: 14.1 MMOL/L (ref 8–16)
BASOPHILS # BLD AUTO: 0 10*3/UL (ref 0–0.1)
BASOPHILS NFR BLD AUTO: 0 % (ref 0–1)
BUN SERPL-MCNC: 47 MG/DL (ref 7–26)
BUN/CREAT SERPL: 15 (ref 6–25)
CALCIUM SERPL-MCNC: 8.4 MG/DL (ref 8.4–10.2)
CHLORIDE SERPL-SCNC: 106 MMOL/L (ref 98–107)
CK MB SERPL-MCNC: 1.9 NG/ML (ref 0–5)
CK SERPL-CCNC: 163 IU/L (ref 29–168)
CO2 SERPL-SCNC: 24 MMOL/L (ref 22–29)
DEPRECATED NEUTROPHILS # BLD AUTO: 7.3 10*3/UL (ref 2.1–6.9)
EGFRCR SERPLBLD CKD-EPI 2021: 19 ML/MIN (ref 60–?)
EOSINOPHIL # BLD AUTO: 0 10*3/UL (ref 0–0.4)
EOSINOPHIL NFR BLD AUTO: 0 % (ref 0–6)
ERYTHROCYTE [DISTWIDTH] IN CORD BLOOD: 16.6 % (ref 11.7–14.4)
GLUCOSE SERPLBLD-MCNC: 160 MG/DL (ref 74–118)
HCT VFR BLD AUTO: 32.3 % (ref 34.2–44.1)
HGB BLD-MCNC: 11.2 G/DL (ref 12–16)
LYMPHOCYTES # BLD: 1 10*3/UL (ref 1–3.2)
LYMPHOCYTES NFR BLD AUTO: 11.2 % (ref 18–39.1)
MCH RBC QN AUTO: 28.1 PG (ref 28–32)
MCHC RBC AUTO-ENTMCNC: 34.7 G/DL (ref 31–35)
MCV RBC AUTO: 81 FL (ref 81–99)
MONOCYTES # BLD AUTO: 0.4 10*3/UL (ref 0.2–0.8)
MONOCYTES NFR BLD AUTO: 5 % (ref 4.4–11.3)
NEUTS SEG NFR BLD AUTO: 83.5 % (ref 38.7–80)
PLATELET # BLD AUTO: 150 X10E3/UL (ref 140–360)
POTASSIUM SERPL-SCNC: 4.1 MMOL/L (ref 3.5–5.1)
RBC # BLD AUTO: 3.99 X10E6/UL (ref 3.6–5.1)
SODIUM SERPL-SCNC: 140 MMOL/L (ref 136–145)

## 2020-07-03 PROCEDURE — 5A1D70Z PERFORMANCE OF URINARY FILTRATION, INTERMITTENT, LESS THAN 6 HOURS PER DAY: ICD-10-PCS

## 2020-07-03 RX ADMIN — APIXABAN SCH MG: 2.5 TABLET, FILM COATED ORAL at 09:52

## 2020-07-03 RX ADMIN — CEFEPIME HYDROCHLORIDE SCH MLS/HR: 1 INJECTION, POWDER, FOR SOLUTION INTRAMUSCULAR; INTRAVENOUS at 17:00

## 2020-07-03 RX ADMIN — LINEZOLID SCH MG: 600 TABLET, FILM COATED ORAL at 09:52

## 2020-07-03 RX ADMIN — ASPIRIN 81 MG CHEWABLE TABLET SCH MG: 81 TABLET CHEWABLE at 09:52

## 2020-07-03 RX ADMIN — METOPROLOL TARTRATE SCH MG: 25 TABLET, FILM COATED ORAL at 07:15

## 2020-07-03 RX ADMIN — DOCUSATE SODIUM SCH MG: 100 TABLET, FILM COATED ORAL at 09:52

## 2020-07-03 RX ADMIN — LOSARTAN POTASSIUM SCH MG: 100 TABLET, FILM COATED ORAL at 09:00

## 2020-07-03 RX ADMIN — SODIUM CHLORIDE SCH MG: 900 INJECTION INTRAVENOUS at 17:00

## 2020-07-03 NOTE — NUR
SKILLED NURSING FACILITY DISCHARGE INFORMATION 

PATIENT HAS BEEN ACCEPTED TO: 

NAME: [facility]

ADDRESS:[address] [place] [address] DR HARO MD: MARGARETH

ROOM: [id number]

NURSE CALL REPORT TO: [phone]

IMM SIGNED AND OBTAINED (if applicable): 

THE FOLLOWING DOCUMENTS MUST ACCOMPANY PATIENT FOR TRANSFER:

COPIED CHART: PACKET

## 2020-07-03 NOTE — PROGRESS NOTE
DATE:  

 

Nephrology Followup Note 

 

SUBJECTIVE:  The patient did not report __________.

 

OBJECTIVE:  VITAL SIGNS:  Temperature 97.9, respirations 17, heart rate 54, blood

pressure 168/85. 

 

In view of COVID-19 positive, physical exam was not done.

 

LABORATORY DATA:  Sodium 140, potassium 4.1, chloride 106, CO2 24, BUN __________

 

IMPRESSION:  

1. End stage renal disease, on hemodialysis, status post short hemodialysis yesterday. 

2. COVID-19 pneumonia. 

3. Stable electrolytes and metabolic profile. 

4. Anemia secondary to end stage renal disease.

 

PLAN:  Strict I's and O's.  Continue present treatment.  Continue short dialysis today

and then hopefully no dialysis over the weekend, then dialyze for 3 hours.  Sodium 140,

potassium 3K bath, 2.4 calcium bath, __________.  Further recommendations to follow. 

 

 

 

 

______________________________

Chastity Guerrier MD SA/PENGL

D:  07/03/2020 10:16:55

T:  07/03/2020 11:21:34

Job #:  [id number]

## 2020-07-03 NOTE — PROGRESS NOTE
DATE:  

 

Pulmonary Progress Note 

 

SUBJECTIVE:  The patient was feeling better.  She has no cough or dyspnea.

 

PHYSICAL EXAMINATION:

VITAL SIGNS:  Blood pressure is 118/80, saturation is 98%, and pulse is 59. 

HEENT:  Shows no facial swelling or erythema. 

CARDIAC:  Reveals regular rate and rhythm with normal S1, S2. 

LUNGS:  Auscultation of lungs reveals clear breath sounds bilaterally.  There is no

wheezing. 

ABDOMEN:  Soft, nontender.  There is no rebound or guarding. 

EXTREMITIES:  Shows no leg edema or calf tenderness.  There is no cyanosis or clubbing. 

SKIN:  Shows no rashes. 

NEUROLOGICAL:  Shows no focal abnormalities.

IMPRESSION:  

1. Viral pneumonia and COVID-19 infection.

2. End-stage renal disease.

3. Atrial fibrillation.

 

PLAN:  

1. Discharge to SNF with COVID preparation.

2. Outpatient dialysis.

3. Continue current cardiac regimen.

 

 

 

 

______________________________

MD CHARLES Purdy/CHERYL

D:  07/03/2020 15:46:01

T:  07/03/2020 16:10:53

Job #:  [id number]

## 2020-07-03 NOTE — NUR
blood pressure medications held due to patient being on dialysis machine.

dialysis RN will notify me if he needs me to give oral meds.

## 2020-07-03 NOTE — NUR
[facility] 

report called to atif MENDOZA. 



patients poa- adrian- notified. 

transportation arranged. waiting for arrival at this time.